# Patient Record
Sex: FEMALE | Race: WHITE
[De-identification: names, ages, dates, MRNs, and addresses within clinical notes are randomized per-mention and may not be internally consistent; named-entity substitution may affect disease eponyms.]

---

## 2017-07-03 ENCOUNTER — HOSPITAL ENCOUNTER (OUTPATIENT)
Dept: HOSPITAL 80 - BHFA | Age: 77
End: 2017-07-03
Attending: INTERNAL MEDICINE
Payer: COMMERCIAL

## 2017-07-03 DIAGNOSIS — R60.9: ICD-10-CM

## 2017-07-03 DIAGNOSIS — I10: Primary | ICD-10-CM

## 2017-07-03 DIAGNOSIS — R06.02: ICD-10-CM

## 2017-07-03 DIAGNOSIS — I42.9: ICD-10-CM

## 2017-07-20 ENCOUNTER — HOSPITAL ENCOUNTER (OUTPATIENT)
Dept: HOSPITAL 80 - BHFA | Age: 77
End: 2017-07-20
Attending: INTERNAL MEDICINE
Payer: COMMERCIAL

## 2017-07-20 DIAGNOSIS — I42.9: Primary | ICD-10-CM

## 2017-07-20 DIAGNOSIS — E78.5: ICD-10-CM

## 2017-07-20 DIAGNOSIS — G47.33: ICD-10-CM

## 2017-07-20 DIAGNOSIS — R06.02: ICD-10-CM

## 2017-07-20 DIAGNOSIS — I10: ICD-10-CM

## 2017-08-02 ENCOUNTER — HOSPITAL ENCOUNTER (OUTPATIENT)
Dept: HOSPITAL 80 - FIMAGING | Age: 77
End: 2017-08-02
Attending: INTERNAL MEDICINE
Payer: COMMERCIAL

## 2017-08-02 DIAGNOSIS — K80.20: Primary | ICD-10-CM

## 2017-08-02 DIAGNOSIS — R16.0: ICD-10-CM

## 2017-08-02 DIAGNOSIS — K76.0: ICD-10-CM

## 2017-11-11 ENCOUNTER — HOSPITAL ENCOUNTER (OUTPATIENT)
Dept: HOSPITAL 80 - FED | Age: 77
Setting detail: OBSERVATION
LOS: 1 days | Discharge: HOME | End: 2017-11-12
Attending: FAMILY MEDICINE | Admitting: FAMILY MEDICINE
Payer: COMMERCIAL

## 2017-11-11 VITALS — RESPIRATION RATE: 18 BRPM

## 2017-11-11 DIAGNOSIS — K57.92: Primary | ICD-10-CM

## 2017-11-11 DIAGNOSIS — I50.9: ICD-10-CM

## 2017-11-11 DIAGNOSIS — E27.9: ICD-10-CM

## 2017-11-11 DIAGNOSIS — J96.10: ICD-10-CM

## 2017-11-11 DIAGNOSIS — E05.00: ICD-10-CM

## 2017-11-11 DIAGNOSIS — Z86.79: ICD-10-CM

## 2017-11-11 DIAGNOSIS — E66.9: ICD-10-CM

## 2017-11-11 LAB
% IMMATURE GRANULYOCYTES: 0.6 % (ref 0–1.1)
ABSOLUTE IMMATURE GRANULOCYTES: 0.05 10^3/UL (ref 0–0.1)
ABSOLUTE NRBC COUNT: 0 10^3/UL (ref 0–0.01)
ADD DIFF?: NO
ADD MORPH?: NO
ADD SCAN?: NO
ALBUMIN SERPL-MCNC: 3.8 G/DL (ref 3.5–5)
ALP SERPL-CCNC: 146 IU/L (ref 38–126)
ALT SERPL-CCNC: 45 IU/L (ref 9–52)
ANION GAP SERPL CALC-SCNC: 10 MEQ/L (ref 8–16)
AST SERPL-CCNC: 25 IU/L (ref 14–46)
ATYPICAL LYMPHOCYTE FLAG: 0 (ref 0–99)
BACTERIA #/AREA URNS HPF: (no result) /HPF
BILIRUB SERPL-MCNC: 0.6 MG/DL (ref 0.1–1.4)
BILIRUBIN-CONJUGATED: 0.1 MG/DL (ref 0–0.5)
BILIRUBIN-UNCONJUGATED: 0.5 MG/DL (ref 0–1.1)
CALCIUM SERPL-MCNC: 9.1 MG/DL (ref 8.5–10.4)
CHLORIDE SERPL-SCNC: 100 MEQ/L (ref 97–110)
CO2 SERPL-SCNC: 30 MEQ/L (ref 22–31)
COLOR UR: YELLOW
CREAT SERPL-MCNC: 0.9 MG/DL (ref 0.6–1)
ERYTHROCYTE [DISTWIDTH] IN BLOOD BY AUTOMATED COUNT: 13.8 % (ref 11.5–15.2)
FRAGMENT RBC FLAG: 0 (ref 0–99)
GFR SERPL CREATININE-BSD FRML MDRD: > 60 ML/MIN/{1.73_M2}
GLUCOSE SERPL-MCNC: 126 MG/DL (ref 70–100)
HCT VFR BLD CALC: 41.7 % (ref 38–47)
HGB BLD-MCNC: 14.5 G/DL (ref 12.6–16.3)
LEFT SHIFT FLG: 0 (ref 0–99)
LIPEMIA HEMOLYSIS FLAG: 90 (ref 0–99)
MCH RBC BLDCO QN: 33.3 PG (ref 27.9–34.1)
MCHC RBC AUTO-ENTMCNC: 34.8 G/DL (ref 32.4–36.7)
MCV RBC AUTO: 95.9 FL (ref 81.5–99.8)
MUCOUS THREADS #/AREA URNS LPF: (no result) /LPF
NITRITE UR QL STRIP: NEGATIVE
NRBC-AUTO%: 0 % (ref 0–0.2)
PH UR STRIP: 7 [PH] (ref 5–7.5)
PLATELET # BLD: 191 10^3/UL (ref 150–400)
PLATELET CLUMPS FLAG: 0 (ref 0–99)
PMV BLD AUTO: 10.6 FL (ref 8.7–11.7)
POTASSIUM SERPL-SCNC: 4 MEQ/L (ref 3.5–5.2)
PROT SERPL-MCNC: 6.6 G/DL (ref 6.3–8.2)
RBC # BLD AUTO: 4.35 10^6/UL (ref 4.18–5.33)
RBC #/AREA URNS HPF: (no result) /HPF (ref 0–3)
SODIUM SERPL-SCNC: 140 MEQ/L (ref 134–144)
SP GR UR STRIP: 1 (ref 1–1.03)
WBC #/AREA URNS HPF: (no result) /HPF (ref 0–3)

## 2017-11-11 PROCEDURE — 97162 PT EVAL MOD COMPLEX 30 MIN: CPT

## 2017-11-11 PROCEDURE — 74177 CT ABD & PELVIS W/CONTRAST: CPT

## 2017-11-11 PROCEDURE — 97165 OT EVAL LOW COMPLEX 30 MIN: CPT

## 2017-11-11 PROCEDURE — G0378 HOSPITAL OBSERVATION PER HR: HCPCS

## 2017-11-11 PROCEDURE — 93005 ELECTROCARDIOGRAM TRACING: CPT

## 2017-11-11 RX ADMIN — SACUBITRIL AND VALSARTAN SCH EA: 24; 26 TABLET, FILM COATED ORAL at 20:46

## 2017-11-11 RX ADMIN — FUROSEMIDE SCH MG: 40 TABLET ORAL at 14:20

## 2017-11-11 RX ADMIN — CARVEDILOL SCH MG: 25 TABLET, FILM COATED ORAL at 16:50

## 2017-11-11 RX ADMIN — GABAPENTIN SCH MG: 300 CAPSULE ORAL at 16:49

## 2017-11-11 RX ADMIN — GABAPENTIN SCH MG: 300 CAPSULE ORAL at 20:46

## 2017-11-11 NOTE — GHP
[f rep st]



                                                            HISTORY AND PHYSICAL





DATE OF ADMISSION:  11/11/2017



CHIEF COMPLAINT:  Abdominal pain.



HISTORY OF PRESENT ILLNESS:  This is a 77-year-old female with history of cardiomyopathy, with ejecti
on fraction in the mid 30s, as well as diverticulitis, presented to the emergency department today wi
th abdominal pain.  The patient states that she has had bouts of this type of pain for years.  The la
st time she was formally diagnosed with diverticulitis was about 7 years ago, when she was treated wi
th antibiotics.  She did have a colonoscopy about 10 years ago, which she tells me was unremarkable. 




The patient developed some crampy left lower quadrant abdominal pain Wednesday night.  She then start
ed a clear liquid diet.  Either Wednesday or Thursday night she noticed low-grade fever.  She has bee
n on a clear liquid diet 

since her symptoms on Wednesday, but today, developed which she described as a sharp left-sided pain,
 rated 7/10, that subsequently brought her in the emergency department for further evaluation.  She d
enies any nausea or vomiting.  Her last bowel movement was on Thursday, and was described as diarrhea
.



PAST MEDICAL HISTORY:  

1.  Systolic congestive heart failure/cardiomyopathy with ejection fraction in the mid 30s.  Followed
 by Dr. Mcarthur in Cardiology.

2.  Graves disease, status post radioactive iodine ablation.  Now on replacement.

3.  Breast cancer, status post mastectomy.

4.  Obstructive sleep apnea.

5.  Chronic hypoxemic respiratory failure due to congestive heart failure.



HOME MEDICATIONS:  Reviewed, refer to [email or web address] for details.



ALLERGIES:  Miconazole.



SOCIAL HISTORY:  She denies any tobacco or illicit drug use.  She is a former smoker.  She drinks xenia
roximately an ounce of bourbon per night.



FAMILY HISTORY:  Reviewed and noncontributory.



REVIEW OF SYSTEMS:  Comprehensive 10-point review of systems was done and is negative, except for as 
mentioned in HPI and below.  CARDIOVASCULAR:  The patient denies any chest pain.  She is chronically 
short of breath and is on home oxygen.



PHYSICAL EXAM:  VITAL SIGNS:  Blood pressure 129/86, pulse 75, respiratory rate 18, O2 saturation 94%
 on room air.  Temperature afebrile.  GENERAL:  In no acute distress.  HEART:  S1, S2.  LUNGS:  Clear
.  No wheezes, rales, or rhonchi.  ABDOMEN:  Soft, with mild distention.  There is some tenderness to
 deep palpation in the left lower quadrant.  There is no guarding or rebound tenderness.  Bowel sound
s are normoactive.  EXTREMITIES:  No clubbing or cyanosis.  NEURO:  Cranial nerves 2-12 grossly intac
t.  No focal motor or sensory deficits.  SKIN:  Clear, no rashes.



DIAGNOSTICS:  CT of the abdomen was reviewed, showing acute diverticulitis at the junction of the norris
cending and sigmoid colon without abscess or perforation.  There is also a small right adrenal gland 
nodule, it is mildly increased __________ 2012, and most likely compatible with adenoma. 



WBC is 8.6, hemoglobin 14.5, hematocrit 41.7, platelets 191.  Sodium 140, potassium 4, chloride 100, 
CO2 30, BUN 12, creatinine 0.9, glucose 126.  LFTs unremarkable, with the exception of alk phosphatas
e of 146. 



EKG, which I visualized and personally interpreted, shows sinus rhythm, rate 78 beats per minute, wit
h left bundle branch block.



ASSESSMENT AND PLAN:  This is a 77-year-old female with history of cardiomyopathy, ejection fraction 
in the mid 30s, presenting with: 

1.  Abdominal pain, most likely due to diverticulitis.  Plan:  The patient will be placed on observat
ion, where we will continue IV levofloxacin and Flagyl.  We will start a soft diet.  If she continues
 to be able to tolerate diet with adequate pain control, she may be eligible for discharge in the [facility].  However, if her pain persists, and she is unable to tolerate p.o., she may require inpatient h
ospitalization.

2.  History of cardiomyopathy with chronic respiratory failure, that appears to be compensated.  Plan
:  We will monitor for signs and symptoms of congestive heart failure.

3.  Adrenal nodule seen on CT.  Most likely representing adenoma per Dr. Wolfe in radiology.  Plan:
  The patient should discuss this with her primary care provider, and monitor as indicated.





Job #:  [id number]

## 2017-11-11 NOTE — EDPHY
H & P


Time Seen by Provider: 11/11/17 07:15


HPI/ROS: 





CHIEF COMPLAINT:  Left-sided abdominal pain





HISTORY OF PRESENT ILLNESS:  Patient is a 77-year-old female with a history of 

CHF and Graves disease and obesity who comes to the emergency department 

complaining of left lower quadrant pain for the last 4 days now radiating up to 

her left upper quadrant.  She still has self-diagnosed with diverticulitis is 

in the past and states that he usually resolves with a soft diet.  She has been 

on the soft and then liquid diet since Wednesday without resolution.  No 

fevers.  Intermittent diarrhea that is been nonbloody.  No vomiting.  She does 

suffer from heartburn as well.  It has not been worse lately.  No urinary 

symptoms.  No abdominal surgeries other than tubal ligation.








REVIEW OF SYSTEMS:


Constitutional:  denies: chills, fever, recent illness, recent injury


EENTM: denies: blurred vision, double vision, nose congestion


Respiratory: denies: cough, shortness of breath


Cardiac: denies: chest pain, irregular heart rate, lightheadedness, palpitations


Gastrointestinal/Abdominal:  See HPI


Genitourinary: denies: dysuria, frequency, hematuria, pain


Musculoskeletal: denies: joint pain, muscle pain


Skin: denies: lesions, rash, jaundice, bruising


Neurological: denies: headache, numbness, paresthesia, tingling, dizziness, 

weakness


Hematologic/Lymphatic: denies: blood clots, easy bleeding, easy bruising


Immunologic/allergic: denies: HIV/AIDS, transplant








EXAM:


GENERAL:  Well-appearing, obese and in no acute distress.


HEAD:  Atraumatic, normocephalic.


EYES:  Pupils equal round and reactive to light, extraocular movements intact, 

sclera anicteric, conjunctiva are normal.


ENT:  TMs normal, nares patent, oropharynx clear without exudates.  Moist 

mucous membranes.


NECK:  Normal range of motion, supple without lymphadenopathy or JVD.


LUNGS:  Breath sounds clear to auscultation bilaterally and equal.  No wheezes 

rales or rhonchi.


HEART:  Regular rate and rhythm without murmurs, rubs or gallops.


ABDOMEN:  Soft, nontender, normoactive bowel sounds.  No guarding, no rebound.  

No masses appreciated.


BACK:  No CVA tenderness, no spinal tenderness, step-offs or deformities


EXTREMITIES:  Normal range of motion, no pitting or edema.  No clubbing or 

cyanosis.


NEUROLOGICAL:  Cranial nerves II through XII grossly intact.  Normal speech, 

normal gait.  5/5 strength, normal movement in all extremities, normal sensation


PSYCH:  Normal mood, normal affect.


SKIN:  Warm, dry, normal turgor, no visible rashes or lesions.








Source: Patient


Exam Limitations: No limitations





- Medical/Surgical History


Hx Asthma: No


Hx Chronic Respiratory Disease: No


Hx Diabetes: No


Hx Cardiac Disease: Yes


Other PMH: CHF with ejection fraction 30%, home oxygen, Graves disease, obesity





- Family History


Significant Family History: No pertinent family hx





- Social History


Alcohol Use: Sober


Drug Use: None


Constitutional: 


 Initial Vital Signs











Temperature (C)  36.9 C   11/11/17 07:18


 


Heart Rate  86   11/11/17 07:18


 


Respiratory Rate  18   11/11/17 07:18


 


Blood Pressure  146/70 H  11/11/17 07:18


 


O2 Sat (%)  93   11/11/17 07:18








 











O2 Delivery Mode               Room Air














Allergies/Adverse Reactions: 


 





miconazole [From Neosporin AF] Allergy (Unknown, Verified 11/11/17 11:41)


 Unknown








Home Medications: 














 Medication  Instructions  Recorded


 


Furosemide [Lasix 40 MG (*)] 40 mg PO BIDDIUR 09/24/10


 


Gabapentin [Neurontin] 600 mg PO TID 09/24/10


 


Levothyroxine [Synthroid 137 mcg 137 mcg PO DAILY06 09/24/10





(*)]  


 


Aspirin EC [Aspirin  mg (*)] 325 mg PO DAILY 11/11/17


 


Bupropion HCl [Wellbutrin Xl] 300 mg PO DAILY 11/11/17


 


Calcium Carbonate/Vitamin D3 1 tab PO DAILY 11/11/17





[Oyster Shell Calcium-Vit D Tab]  


 


Carvedilol [Coreg (*)] 25 mg PO BIDMEAL 11/11/17


 


Herbals/Supplements -Info Only 1 ea PO DAILY 11/11/17


 


Multivitamins [Multivitamin (*)] 1 each PO DAILY 11/11/17


 


Pramipexole Di-HCl [Mirapex 1 mg 1 - 2 mg PO HS PRN 11/11/17





(*)]  


 


Sacubitril/Valsartan 24/26Mg 1 tab PO BID 11/11/17





[Entresto 24 mg/26 mg (RX)]  


 


Spironolactone [Aldactone 25 MG 25 mg PO DAILY 11/11/17





(*)]  


 


Ciprofloxacin [Cipro] 500 mg PO BID #14 tab 11/12/17


 


metroNIDAZOLE [Flagyl 500 mg (*)] 500 mg PO BID #39 tab 11/12/17














Medical Decision Making





- Diagnostics


EKG Interpretation: 





An EKG obtained and was read and documented in trace view.  Please see trace 

view for full reading and report.  Left bundle branch block, no acute ischemic 

changes .  


ED Course/Re-evaluation: 





10:00 a.m. we discussed the CT results.  I recommended admission and 

antibiotics.  The patient agrees.  I discussed the case with Dr. Sunny Carrasquillo who 

will admit to the medical service.  Patient is currently not having any cardiac 

issues.


Differential Diagnosis: 





Partial list of the Differential diagnosis considered include but were not 

limited to;  diverticulitis, perforation, kidney stone, urinary tract infection 

and although unlikely based on the history and physical exam, I also considered 

obstruction, ischemia, chest pain. 








- Data Points


Laboratory Results: 


 Laboratory Results





 11/11/17 08:50 





 11/11/17 08:50 








Medications Given: 


 








Discontinued Medications





Acetaminophen (Tylenol)  650 mg PO Q6H PRN


   PRN Reason: Pain, Mild/Fever, Can Take PO


   Stop: 05/10/18 18:00


   Last Admin: 11/12/17 07:56 Dose:  650 mg


Aspirin Buffered (Aspirin Ec)  325 mg PO DAILY [facility]


   Stop: 05/11/18 08:59


   Last Admin: 11/12/17 07:57 Dose:  325 mg


Bupropion HCl (Wellbutrin Xl)  300 mg PO DAILY [facility]


   Stop: 05/11/18 08:59


   Last Admin: 11/12/17 12:14 Dose:  300 mg


Calcium/Vitamin D (Calcium Carb W/Vit D)  500 mg PO DAILY [facility]


   Stop: 05/11/18 08:59


   Last Admin: 11/12/17 08:00 Dose:  Not Given


Carvedilol (Coreg)  25 mg PO BIDMEAL [facility]


   Stop: 05/10/18 17:59


   Last Admin: 11/12/17 07:58 Dose:  25 mg


Enoxaparin Sodium (Lovenox)  40 mg SC DAILY [facility]


   Stop: 05/11/18 08:59


   Last Admin: 11/12/17 07:54 Dose:  40 mg


Furosemide (Lasix)  40 mg PO BIDDIUR [facility]


   Stop: 05/10/18 14:59


   Last Admin: 11/12/17 07:58 Dose:  40 mg


Gabapentin (Neurontin)  600 mg PO TID [facility]


   Stop: 05/10/18 15:59


   Last Admin: 11/12/17 07:57 Dose:  600 mg


Hydromorphone HCl (Dilaudid)  0.5 mg IVP EDNOW ONE


   Stop: 11/11/17 07:23


   Last Admin: 11/11/17 10:50 Dose:  Not Given


Hyoscyamine Sulfate (Levsin, Hyomax-Sl)  0.125 mg PO Q6HRS PRN


   PRN Reason: abd pain


   Stop: 05/10/18 12:36


   Last Admin: 11/11/17 14:23 Dose:  0.125 mg


Sodium Chloride (Ns)  500 mls @ 0 mls/hr IV EDNOW ONE; Wide Open


   PRN Reason: Protocol


   Stop: 11/11/17 07:24


   Last Admin: 11/11/17 09:13 Dose:  500 mls


Levofloxacin/Dextrose (Levaquin 750 Mg (Premix))  150 mls @ 100 mls/hr IV EDNOW 

ONE


   PRN Reason: Protocol


   Stop: 11/11/17 11:27


   Last Admin: 11/11/17 11:54 Dose:  150 mls


Metronidazole/Sodium Chloride (Flagyl 500 Mg (Premix))  100 mls @ 100 mls/hr IV 

EDNOW ONE


   PRN Reason: Protocol


   Stop: 11/11/17 10:59


   Last Admin: 11/11/17 10:31 Dose:  100 mls


Levofloxacin/Dextrose (Levaquin 750 Mg (Premix))  150 mls @ 100 mls/hr IV DAILY 

[facility]


   PRN Reason: Protocol


   Stop: 12/12/17 08:59


   Last Admin: 11/12/17 07:54 Dose:  150 mls


Metronidazole/Sodium Chloride (Flagyl 500 Mg (Premix))  100 mls @ 100 mls/hr IV 

Q8H [facility]


   PRN Reason: Protocol


   Stop: 12/11/17 17:59


   Last Admin: 11/12/17 10:24 Dose:  100 mls


Levothyroxine Sodium (Synthroid)  137 mcg PO DAILY06 [facility]


   Stop: 05/11/18 05:59


   Last Admin: 11/12/17 05:23 Dose:  137 mcg


Multivitamins (Tab-A-Gagan)  1 each PO DAILY [facility]


   Stop: 05/11/18 08:59


   Last Admin: 11/12/17 08:00 Dose:  1 each


Ondansetron HCl (Zofran)  4 mg IVP EDNOW ONE


   Stop: 11/11/17 07:23


   Last Admin: 11/11/17 10:50 Dose:  Not Given


Pramipexole Dihydrochloride (Mirapex)  1 - 2 mg PO HS PRN


   PRN Reason: restless leg


   Stop: 05/10/18 12:28


   Last Admin: 11/11/17 21:22 Dose:  1 mg


Sacubitril/Valsartan (Entresto 24 Mg/26 Mg)  1 ea PO BID [facility]


   Stop: 05/10/18 20:59


   Last Admin: 11/12/17 07:59 Dose:  1 ea


Spironolactone (Aldactone)  25 mg PO DAILY [facility]


   Stop: 05/11/18 08:59


   Last Admin: 11/12/17 12:15 Dose:  25 mg








Departure





- Departure


Disposition: [facility]


Clinical Impression: 


 Diverticulitis large intestine w/o perforation or abscess w/o bleeding





Condition: Fair

## 2017-11-11 NOTE — CPEKG
Heart Rate: 78

RR Interval: 769

P-R Interval: 196

QRSD Interval: 156

QT Interval: 432

QTC Interval: 493

P Axis: 62

QRS Axis: -44

T Wave Axis: 120

EKG Severity - ABNORMAL ECG -

EKG Impression: SINUS RHYTHM

EKG Impression: LEFT BUNDLE BRANCH BLOCK

Electronically Signed By: Anthony East 11-Nov-2017 08:58:38

## 2017-11-12 VITALS
SYSTOLIC BLOOD PRESSURE: 129 MMHG | HEART RATE: 80 BPM | DIASTOLIC BLOOD PRESSURE: 61 MMHG | TEMPERATURE: 97.5 F | OXYGEN SATURATION: 94 %

## 2017-11-12 RX ADMIN — SACUBITRIL AND VALSARTAN SCH EA: 24; 26 TABLET, FILM COATED ORAL at 07:59

## 2017-11-12 RX ADMIN — FUROSEMIDE SCH MG: 40 TABLET ORAL at 07:58

## 2017-11-12 RX ADMIN — GABAPENTIN SCH MG: 300 CAPSULE ORAL at 07:57

## 2017-11-12 RX ADMIN — CARVEDILOL SCH MG: 25 TABLET, FILM COATED ORAL at 07:58

## 2017-11-12 RX ADMIN — SPIRONOLACTONE SCH: 25 TABLET, FILM COATED ORAL at 07:58

## 2017-11-12 RX ADMIN — SPIRONOLACTONE SCH MG: 25 TABLET, FILM COATED ORAL at 12:15

## 2017-11-12 NOTE — ASDISCHSUM
----------------------------------------------

Discharge Information

----------------------------------------------

Plan Status:Home with Home Health                    Medically Cleared to Leave:

Discharge Date:11/12/2017 12:59 PM                    D/C Disposition:Home Health Service

ADT D/C Disposition:Home, Routine, Self-Care         Projected Discharge Date:11/12/2017 12:59 PM

Transportation at D/C:                               Discharge Delay Reason:

Follow-Up Date:11/12/2017 12:59 PM                   Discharge Slot:

Final Diagnosis:

----------------------------------------------

Placement Information

----------------------------------------------

----------------------------------------------

Patient Contact Information

----------------------------------------------

Contact Name:FRANCY                          Relationship:Daughter

Address:[address]                            Home [phone]

                                                     Work [phone]

City:[place]                                      Alternate Phone:

State/Zip Code:[place] [address]                              Email:

----------------------------------------------

Financial Information

----------------------------------------------

Financial Class:

Primary Plan Desc:MEDICARE OUTPATIENT                Primary Plan Number:[id number]

Secondary Plan Desc:[employer]/MDR SUPPLEMENT              Secondary Plan Number:[id number]

 

 

----------------------------------------------

Assessment Information

----------------------------------------------

----------------------------------------------

BC CM Progress Note

----------------------------------------------

CM Note

 

CM Note                       

Notes:

PT recommending HHC and use of walker which is new to pt. List given to daughter for med 

supply/loan closets. CM met w/pt and daughter and discussed HHC; pt agreeable to home PT, would 

like to use [facility]. Referral sent to [facility] and spoke w/Dasha who said they could 

accept. Orders/info sent, conf rec'd. Notified pt at home that [facility] could accept and that 

they would call her tomorrow. 

 

Date Signed:  11/12/2017 05:17 PM

Electronically Signed By:Meredith Dong RN

 

 

----------------------------------------------

Intervention Information

----------------------------------------------

## 2017-11-12 NOTE — ASMTCMCOM
CM Note

 

CM Note                       

Notes:

PT recommending HHC and use of walker which is new to pt. List given to daughter for med 

supply/loan closets. CM met w/pt and daughter and discussed HHC; pt agreeable to home PT, would 

like to use [facility]. Referral sent to [facility] and spoke w/Dasha who said they could 

accept. Orders/info sent, conf rec'd. Notified pt at home that [facility] could accept and that 

they would call her tomorrow. 

 

Date Signed:  11/12/2017 05:17 PM

Electronically Signed By:Meredith Dong RN

## 2017-11-12 NOTE — PDIAF
- Diagnosis


Diagnosis: diverticulitis


Code Status: Full Code





- Medication Management


Discharge Medications: 


 Medications to Continue on Transfer





Furosemide [Lasix 40 MG (*)] 40 mg PO BIDDIUR 09/24/10 [Last Taken 11/10/17 15:

00]


Gabapentin [Neurontin] 600 mg PO TID 09/24/10 [Last Taken 11/11/17 06:00]


Levothyroxine [Synthroid 137 mcg (*)] 137 mcg PO DAILY06 09/24/10 [Last Taken 11 /11/17]


Aspirin EC [Aspirin  mg (*)] 325 mg PO DAILY 11/11/17 [Last Taken 11/10/17

]


Bupropion HCl [Wellbutrin Xl] 300 mg PO DAILY 11/11/17 [Last Taken 11/10/17]


Calcium Carbonate/Vitamin D3 [Oyster Shell Calcium-Vit D Tab] 1 tab PO DAILY 11/ 11/17 [Last Taken 11/10/17]


Carvedilol [Coreg (*)] 25 mg PO BIDMEAL 11/11/17 [Last Taken 11/10/17 18:00]


Herbals/Supplements -Info Only 1 ea PO DAILY 11/11/17 [Last Taken Unknown]


Multivitamins [Multivitamin (*)] 1 each PO DAILY 11/11/17 [Last Taken 11/10/17]


Pramipexole Di-HCl [Mirapex 1 mg (*)] 1 - 2 mg PO HS PRN 11/11/17 [Last Taken 11

/10/17]


Sacubitril/Valsartan 24/26Mg [Entresto 24 mg/26 mg (RX)] 1 tab PO BID 11/11/17 [

Last Taken Unknown]


Spironolactone [Aldactone 25 MG (*)] 25 mg PO DAILY 11/11/17 [Last Taken 11/10/

17]


Ciprofloxacin [Cipro] 500 mg PO BID #14 tab 11/12/17 [Last Taken Unknown]


metroNIDAZOLE [Flagyl 500 mg (*)] 500 mg PO BID #39 tab 11/12/17 [Last Taken 

Unknown]








Discharge Medications: Refer to the Discharge Home Medication list for PRN 

reason.





- Orders


Services needed: Home Care, Physical Therapy


Home Care Face to Face: I certify that this patient was under my care and that 

I had the required face-to-face encounter meeting the encounter requirements on 

the discharge day.  My findings support the fact that the patient is homebound 

as defined in


Home Care Face to Face Continued: CMS Chapter 7 Medicare Benefits Manual 30.1.1

, The condition of the patient is such that there exists a normal inability to 

leave home and consequently, leaving home would require a considerable and 

taxing effort.





- Follow Up Care


Current Providers and Referrals: 


Sherine Villanueva MD [Primary Care Provider] - As per Instructions

## 2017-11-12 NOTE — GDS
[f rep st]



                                                             DISCHARGE SUMMARY





DISCHARGE DIAGNOSES:  

1.  Acute diverticulitis.

2.  History of cardiomyopathy with chronic respiratory failure and compensated congestive heart failu
re.  

3.  Adrenal nodule, most likely due to adenoma per radiology report.



HOSPITAL COURSE:  Acute diverticulitis:  The patient presented to the hospital with abdominal pain.  
She was started on IV Levaquin and Flagyl.  On hospital day #1, her abdominal pain is improving.  She
 has been afebrile.  She is tolerating a regular diet.  On day of discharge, she states she is feelin
g better and is agreeable to leave the hospital.



PHYSICAL EXAM:  VITAL SIGNS:  On day of discharge, blood pressure 129/61, pulse of 80, respiratory ra
te 18, O2 saturation 94% on 2 L.  Temperature afebrile.  GENERAL:  No acute distress.  HEART:  S1, S2
.  LUNGS:  Clear.  No wheezes, rales or rhonchi.  ABDOMEN:  Soft, nontender, nondistended.  No guardi
ng or rebound tenderness.  Normoactive bowel sounds.



DIAGNOSTICS DONE THIS HOSPITAL STAY:  A CT of the abdomen done 11/11/2017, refer to report.



DISCHARGE MEDICATIONS:  Please refer to discharge medication reconciliation in [facility] for full deta
ils.  Below is a preliminary list. 



New medications on hospital discharge:  Cipro 500 mg p.o. b.i.d. to complete 2-week course.  Flagyl 5
00 mg p.o. t.i.d. to complete a 2-week course.



DISCHARGE INSTRUCTIONS:  The patient will be discharged from the hospital, where she should follow up
 with her primary care provider as scheduled.  She was urged to seek medical attention if her pain wo
rsens, she becomes febrile, or has any worsening of her symptoms.





Job #:  [id number]

## 2018-02-23 ENCOUNTER — HOSPITAL ENCOUNTER (OUTPATIENT)
Dept: HOSPITAL 80 - CIMAGING | Age: 78
End: 2018-02-23
Attending: PODIATRIST
Payer: COMMERCIAL

## 2018-02-23 DIAGNOSIS — L97.521: ICD-10-CM

## 2018-02-23 DIAGNOSIS — M85.872: Primary | ICD-10-CM

## 2018-05-21 ENCOUNTER — HOSPITAL ENCOUNTER (OUTPATIENT)
Dept: HOSPITAL 80 - BHFA | Age: 78
End: 2018-05-21
Attending: INTERNAL MEDICINE
Payer: COMMERCIAL

## 2018-05-21 DIAGNOSIS — I44.7: ICD-10-CM

## 2018-05-21 DIAGNOSIS — Z01.810: Primary | ICD-10-CM

## 2018-05-21 DIAGNOSIS — I50.23: ICD-10-CM

## 2018-05-21 DIAGNOSIS — I11.0: ICD-10-CM

## 2018-12-03 ENCOUNTER — HOSPITAL ENCOUNTER (INPATIENT)
Dept: HOSPITAL 80 - FED | Age: 78
LOS: 9 days | Discharge: NURSING, REHAB OR LONG-TERM CARE | DRG: 223 | End: 2018-12-12
Attending: INTERNAL MEDICINE | Admitting: INTERNAL MEDICINE
Payer: COMMERCIAL

## 2018-12-03 DIAGNOSIS — E03.9: ICD-10-CM

## 2018-12-03 DIAGNOSIS — I11.0: Primary | ICD-10-CM

## 2018-12-03 DIAGNOSIS — I44.7: ICD-10-CM

## 2018-12-03 DIAGNOSIS — N28.9: ICD-10-CM

## 2018-12-03 DIAGNOSIS — B37.2: ICD-10-CM

## 2018-12-03 DIAGNOSIS — I50.22: ICD-10-CM

## 2018-12-03 DIAGNOSIS — Z87.891: ICD-10-CM

## 2018-12-03 DIAGNOSIS — Z95.0: ICD-10-CM

## 2018-12-03 DIAGNOSIS — Z90.13: ICD-10-CM

## 2018-12-03 DIAGNOSIS — E66.01: ICD-10-CM

## 2018-12-03 DIAGNOSIS — Z85.3: ICD-10-CM

## 2018-12-03 DIAGNOSIS — I42.9: ICD-10-CM

## 2018-12-03 DIAGNOSIS — G47.33: ICD-10-CM

## 2018-12-03 DIAGNOSIS — E87.1: ICD-10-CM

## 2018-12-03 LAB
INR PPP: 0.93 (ref 0.83–1.16)
PLATELET # BLD: 211 10^3/UL (ref 150–400)
PROTHROMBIN TIME: 12.7 SEC (ref 12–15)

## 2018-12-03 PROCEDURE — C1887 CATHETER, GUIDING: HCPCS

## 2018-12-03 PROCEDURE — C1882 AICD, OTHER THAN SING/DUAL: HCPCS

## 2018-12-03 PROCEDURE — C1777 LEAD, AICD, ENDO SINGLE COIL: HCPCS

## 2018-12-03 PROCEDURE — C1900 LEAD, CORONARY VENOUS: HCPCS

## 2018-12-03 PROCEDURE — C1769 GUIDE WIRE: HCPCS

## 2018-12-03 PROCEDURE — C1898 LEAD, PMKR, OTHER THAN TRANS: HCPCS

## 2018-12-03 PROCEDURE — C1751 CATH, INF, PER/CENT/MIDLINE: HCPCS

## 2018-12-03 RX ADMIN — SACUBITRIL AND VALSARTAN SCH EA: 24; 26 TABLET, FILM COATED ORAL at 21:58

## 2018-12-03 RX ADMIN — PRAMIPEXOLE DIHYDROCHLORIDE PRN MG: 1 TABLET ORAL at 21:56

## 2018-12-03 RX ADMIN — GABAPENTIN SCH MG: 300 CAPSULE ORAL at 21:57

## 2018-12-03 RX ADMIN — CARVEDILOL SCH MG: 25 TABLET, FILM COATED ORAL at 21:58

## 2018-12-03 RX ADMIN — SODIUM CHLORIDE SCH MLS: 900 INJECTION, SOLUTION INTRAVENOUS at 21:38

## 2018-12-03 NOTE — CPEKG
Test Reason : OPEN

Blood Pressure : ***/*** mmHG

Vent. Rate : 075 BPM     Atrial Rate : 075 BPM

   P-R Int : 172 ms          QRS Dur : 153 ms

    QT Int : 442 ms       P-R-T Axes : 047 -48 068 degrees

   QTc Int : 494 ms

 

Sinus rhythm

Left bundle branch block

 

Confirmed by Skylar Matos ([id number]) on 12/3/2018 7:54:48 PM

 

Referred By:             Confirmed By:Skylar Matos

## 2018-12-03 NOTE — EDPHY
H & P


Time Seen by Provider: 12/03/18 16:11


HPI/ROS: 





CHIEF COMPLAINT:  Shortness of breath, leg edema





HISTORY OF PRESENT ILLNESS:  The patient is a 70-year-old female with a history 

of systolic congestive heart failure and cardiomyopathy who presents emergency 

department with increasing shortness of breath and pedal edema.  She was seen 

by [facility].  She had increased weight of 25 lb from 1 year ago.  The she 

is noted to have an EF of 33%.  She was sent to the emergency department for 

admission and further evaluation.





REVIEW OF SYSTEMS:  


10 systems were reveiwed and are negative with the exception of the elements 

mentioned in the history of present illness.


Past Medical/Surgical History: 





Includes CHF, Graves disease, obesity hypertension, obstructive sleep apnea





Smoking Status: Former smoker


Physical Exam: 





36.4, 152/90, 81, 20, 90% on room air


GENERAL:  No acute distress, alert.  Obese


HEENT:  Eyes normal to inspection, normal pharynx, no signs of dehydration.


NECK:  Normal, supple.


RESPIRATORY:  Clear to auscultation bilaterally, no rales, rhonchi or wheezing.


CVS:  Regular rate and rhythm, no rubs, murmurs, or gallops.


ABDOMEN:  Soft, nontender, nondistended, no organomegaly.


BACK:  Normal to inspection, no CVA tenderness.


SKIN:  Normal color, no rash, warm, dry.  No pallor.


EXTREMITIES:  No pedal edema, no calf tenderness, no Homans sign or cords, no 

joint swelling.


NEURO/PSYCH:  Alert and oriented, normal mood and affect, normal motor sensory 

exam.  


Constitutional: 


 Initial Vital Signs











Temperature (C)  36.4 C   12/03/18 16:05


 


Heart Rate  81   12/03/18 16:05


 


Respiratory Rate  20   12/03/18 16:05


 


Blood Pressure  152/90 H  12/03/18 16:05


 


O2 Sat (%)  90 L  12/03/18 16:05








 











O2 Delivery Mode               Nasal Cannula


 


O2 (L/minute)                  3














Allergies/Adverse Reactions: 


 





miconazole [From Neosporin AF] Allergy (Unknown, Verified 11/11/17 11:41)


 Unknown


amoxicillin [From Augmentin] Allergy (Verified 12/03/18 16:10)


 


clavulanic acid [From Augmentin] Allergy (Verified 12/03/18 16:10)


 








Home Medications: 














 Medication  Instructions  Recorded


 


Furosemide [Lasix 40 MG (*)] 40 mg PO BIDDIUR 09/24/10


 


Gabapentin [Neurontin] 600 mg PO TID 09/24/10


 


Levothyroxine [Synthroid 137 mcg 137 mcg PO DAILY06 09/24/10





(*)]  


 


Aspirin EC [Aspirin  mg (*)] 325 mg PO DAILY 11/11/17


 


Bupropion HCl [Wellbutrin Xl] 300 mg PO DAILY 11/11/17


 


Calcium Carbonate/Vitamin D3 1 tab PO DAILY 11/11/17





[Oyster Shell Calcium-Vit D Tab]  


 


Carvedilol [Coreg (*)] 25 mg PO BIDMEAL 11/11/17


 


Herbals/Supplements -Info Only 1 ea PO DAILY 11/11/17


 


Multivitamins [Multivitamin (*)] 1 each PO DAILY 11/11/17


 


Pramipexole Di-HCl [Mirapex 1 mg 1 - 2 mg PO HS PRN 11/11/17





(*)]  


 


Sacubitril/Valsartan 24/26Mg 1 tab PO BID 11/11/17





[Entresto 24 mg/26 mg (RX)]  


 


Spironolactone [Aldactone 25 MG 25 mg PO DAILY 11/11/17





(*)]  


 


Ciprofloxacin [Cipro] 500 mg PO BID #14 tab 11/12/17


 


metroNIDAZOLE [Flagyl 500 mg (*)] 500 mg PO BID #39 tab 11/12/17














Medical Decision Making





- Diagnostics


Imaging Results: 


 Imaging Impressions





Chest X-Ray  12/03/18 16:25


Impression: Moderate cardiac enlargement with pulmonary vascular congestion in 

the lower lungs bilaterally suggestive of congestive failure..











ED Course/Re-evaluation: 





In the emergency department I discussed possible etiologies with the patient.  

I answered all her questions.  IV was placed.  Laboratory studies, chest x-ray 

and EKG were obtained.





EKG:  Left bundle branch block at 75. 





Troponin is 0.02





White count is 10.82.  Hematocrit is normal.  Chemistry panel is pending.





BNP is 402. Chemistry panel is unremarkable.





I discussed the case with Dr. Randolph.  The patient was given Lasix 40 mg IV.

  


Differential Diagnosis: 





My differential includes but is not limited to ACS, acute MI, CHF, PE, pneumonia

, bronchitis, respiratory failure





- Data Points


Laboratory Results: 


 Laboratory Results





 12/03/18 16:32 





 12/03/18 16:32 





 











  12/03/18 12/03/18 12/03/18





  16:40 16:32 16:32


 


WBC      





    


 


RBC      





    


 


Hgb      





    


 


Hct      





    


 


MCV      





    


 


MCH      





    


 


MCHC      





    


 


RDW      





    


 


Plt Count      





    


 


MPV      





    


 


Neut % (Auto)      





    


 


Lymph % (Auto)      





    


 


Mono % (Auto)      





    


 


Eos % (Auto)      





    


 


Baso % (Auto)      





    


 


Nucleat RBC Rel Count      





    


 


Absolute Neuts (auto)      





    


 


Absolute Lymphs (auto)      





    


 


Absolute Monos (auto)      





    


 


Absolute Eos (auto)      





    


 


Absolute Basos (auto)      





    


 


Absolute Nucleated RBC      





    


 


Immature Gran %      





    


 


Immature Gran #      





    


 


PT      12.7 SEC SEC





     (12.0-15.0) 


 


INR      0.93 





     (0.83-1.16) 


 


APTT      24.5 SEC SEC





     (23.0-38.0) 


 


Sodium    136 mEq/L mEq/L  





    (135-145)  


 


Potassium    4.3 mEq/L mEq/L  





    (3.3-5.0)  


 


Chloride    97 mEq/L mEq/L  





    ()  


 


Carbon Dioxide    32 mEq/l H mEq/l  





    (22-31)  


 


Anion Gap    7 mEq/L mEq/L  





    (6-14)  


 


BUN    21 mg/dL mg/dL  





    (7-23)  


 


Creatinine    0.8 mg/dL mg/dL  





    (0.6-1.0)  


 


Estimated GFR    > 60   





    


 


Glucose    141 mg/dL H mg/dL  





    ()  


 


Calcium    9.1 mg/dL mg/dL  





    (8.5-10.4)  


 


POC Troponin I  0.02 ng/mL ng/mL    





   (0.00-0.08)   


 


NT-Pro-B Natriuret Pep    402 pg/mL pg/mL  





    (0-450)  














  12/03/18





  16:32


 


WBC  10.82 10^3/uL H 10^3/uL





   (3.80-9.50) 


 


RBC  4.42 10^6/uL 10^6/uL





   (4.18-5.33) 


 


Hgb  14.3 g/dL g/dL





   (12.6-16.3) 


 


Hct  43.6 % %





   (38.0-47.0) 


 


MCV  98.6 fL fL





   (81.5-99.8) 


 


MCH  32.4 pg pg





   (27.9-34.1) 


 


MCHC  32.8 g/dL g/dL





   (32.4-36.7) 


 


RDW  15.0 % %





   (11.5-15.2) 


 


Plt Count  211 10^3/uL 10^3/uL





   (150-400) 


 


MPV  10.3 fL fL





   (8.7-11.7) 


 


Neut % (Auto)  79.3 % H %





   (39.3-74.2) 


 


Lymph % (Auto)  7.7 % L %





   (15.0-45.0) 


 


Mono % (Auto)  6.7 % %





   (4.5-13.0) 


 


Eos % (Auto)  5.0 % %





   (0.6-7.6) 


 


Baso % (Auto)  0.6 % %





   (0.3-1.7) 


 


Nucleat RBC Rel Count  0.0 % %





   (0.0-0.2) 


 


Absolute Neuts (auto)  8.58 10^3/uL H 10^3/uL





   (1.70-6.50) 


 


Absolute Lymphs (auto)  0.83 10^3/uL L 10^3/uL





   (1.00-3.00) 


 


Absolute Monos (auto)  0.72 10^3/uL 10^3/uL





   (0.30-0.80) 


 


Absolute Eos (auto)  0.54 10^3/uL H 10^3/uL





   (0.03-0.40) 


 


Absolute Basos (auto)  0.07 10^3/uL 10^3/uL





   (0.02-0.10) 


 


Absolute Nucleated RBC  0.00 10^3/uL 10^3/uL





   (0-0.01) 


 


Immature Gran %  0.7 % %





   (0.0-1.1) 


 


Immature Gran #  0.08 10^3/uL 10^3/uL





   (0.00-0.10) 


 


PT  





  


 


INR  





  


 


APTT  





  


 


Sodium  





  


 


Potassium  





  


 


Chloride  





  


 


Carbon Dioxide  





  


 


Anion Gap  





  


 


BUN  





  


 


Creatinine  





  


 


Estimated GFR  





  


 


Glucose  





  


 


Calcium  





  


 


POC Troponin I  





  


 


NT-Pro-B Natriuret Pep  





  











Point of Care Test Results: 


 Chemistry











  12/03/18





  16:40


 


POC Troponin I  0.02 ng/mL ng/mL





   (0.00-0.08) 














Departure





- Departure


Disposition: [facility]


Clinical Impression: 


 Shortness of breath





CHF (congestive heart failure)


Qualifiers:


 Heart failure type: systolic Heart failure chronicity: acute Qualified Code(s)

: I50.21 - Acute systolic (congestive) heart failure





Condition: Good


Referrals: 


Sherine Villanueva MD [Primary Care Provider] - As per Instructions

## 2018-12-03 NOTE — PDGENHP
History and Physical





- Chief Complaint


weeping edema





- History of Present Illness


77 yo F with PMH that includes chronic systolic heart failure with prior EF of 

30% as well as obesity and TIMI presenting from cardiology clinic with concerns 

of increased lower extremity swelling with now weeping edema to the groin 

bilaterally. She notes she has had some issues with increased shortness of 

breath as well, but that seemed to be intermittent and present only with 

exertion. She has gained around 25 pound in the last month or so but notes that 

swelling in the legs only got very severe in the last 2 weeks. She notes she 

has failed to follow up with cardiology as she was scheduled to and given that 

her PCP was on vacation did not follow up with her PCP either. She denies chest 

pain, she denies fevers or chills, she has never had such significant swelling 

in the past. She has not had any change in her bowel habits but notes she has 

chronic diarrhea. She lives independently with her son who notes he was 

concerned about her legs but the swelling seemed to more or less come and go 

and so he did not realize how bad it had gotten. 





History Information





- Allergies/Home Medication List


Allergies/Adverse Reactions: 








miconazole [From Neosporin AF] Allergy (Unknown, Verified 11/11/17 11:41)


 Unknown


amoxicillin [From Augmentin] Allergy (Verified 12/03/18 16:10)


 


clavulanic acid [From Augmentin] Allergy (Verified 12/03/18 16:10)


 





Home Medications: 








Furosemide [Lasix 40 MG (*)] 40 mg PO BIDDIUR 09/24/10 [Last Taken 12/03/18 08:

00]


Gabapentin [Neurontin] 600 mg PO TID 09/24/10 [Last Taken 12/03/18 08:00]


Levothyroxine [Synthroid 137 mcg (*)] 137 mcg PO DAILY06 09/24/10 [Last Taken 12 /03/18]


Aspirin EC [Aspirin  mg (*)] 325 mg PO DAILY 11/11/17 [Last Taken 12/03/18

]


Bupropion HCl [Wellbutrin Xl] 300 mg PO DAILY 11/11/17 [Last Taken 12/03/18]


Calcium Carbonate/Vitamin D3 [Oyster Shell Calcium-Vit D Tab] 1 tab PO DAILY 11/ 11/17 [Last Taken 11/10/17]


Carvedilol [Coreg (*)] 25 mg PO BIDMEAL 11/11/17 [Last Taken 12/03/18 08:00]


Herbals/Supplements -Info Only 1 ea PO DAILY 11/11/17 [Last Taken Unknown]


Multivitamins [Multivitamin (*)] 1 each PO DAILY 11/11/17 [Last Taken 11/10/17]


Pramipexole Di-HCl [Mirapex 1 mg (*)] 1 mg PO HS PRN 11/11/17 [Last Taken 11/10/

17]


Sacubitril/Valsartan 24/26Mg [Entresto 24 mg/26 mg (RX)] 1 tab PO BID 11/11/17 [

Last Taken 12/03/18 08:00]


Spironolactone [Aldactone 25 MG (*)] 25 mg PO DAILY 11/11/17 [Last Taken 12/03/ 18]





I have personally reviewed and updated: family history, medical history, social 

history, surgical history





- Past Medical History


cancer (breast cancer), CHF (systolic with EF of 30%), hypertension


Additional medical history: diverticulitis.  Graves disease and now 

hypothyroid.  chronic venous stasis with chronic lower extremity wounds.  

obesity





- Surgical History


Reports: cancer surgery (bilateral mastectomy)





- Family History


Positive for: non-pertinent





- Social History


Smoking Status: Former smoker


Alcohol Use: Occasionally


Drug Use: None


Additional social history: has 2 children, lives with her son





Review of Systems


Review of Systems: 





ROS: 10pt was reviewed & negative except for what was stated in HPI & below





Physical Exam


Physical Exam: 

















Temp Pulse Resp BP Pulse Ox


 


 36.6 C   86   14   142/78 H  97 


 


 12/03/18 20:09  12/03/18 20:09  12/03/18 20:09  12/03/18 20:09  12/03/18 20:09




















O2 (L/minute)                  3














Constitutional: not in pain, chronically ill appearing, obese


Eyes: PERRL, anicteric sclera


Ears, Nose, Mouth, Throat: moist mucous membranes, poor dentition


Cardiovascular: regular rate and rhythym, systolic murmur, edema (weeping edema 

to mid thighs)


Respiratory: reduced air movement, inspiratory crackles


Gastrointestinal: normoactive bowel sounds, soft, non-tender abdomen


Genitourinary: no bladder tenderness


Skin: warm, other (bilateral lower extremities with erythema/edema weeping to 

thighs with associated skin breakdown)


Musculoskeletal: no muscle tenderness


Neurologic: AAOx3


Psychiatric: interacting appropriately, not anxious, not encephalopathic





Lab Data & Imaging Review





 12/03/18 16:32





 12/03/18 16:32














WBC  10.82 10^3/uL (3.80-9.50)  H  12/03/18  16:32    


 


RBC  4.42 10^6/uL (4.18-5.33)   12/03/18  16:32    


 


Hgb  14.3 g/dL (12.6-16.3)   12/03/18  16:32    


 


Hct  43.6 % (38.0-47.0)   12/03/18  16:32    


 


MCV  98.6 fL (81.5-99.8)   12/03/18  16:32    


 


MCH  32.4 pg (27.9-34.1)   12/03/18  16:32    


 


MCHC  32.8 g/dL (32.4-36.7)   12/03/18  16:32    


 


RDW  15.0 % (11.5-15.2)   12/03/18  16:32    


 


Plt Count  211 10^3/uL (150-400)   12/03/18  16:32    


 


MPV  10.3 fL (8.7-11.7)   12/03/18  16:32    


 


Neut % (Auto)  79.3 % (39.3-74.2)  H  12/03/18  16:32    


 


Lymph % (Auto)  7.7 % (15.0-45.0)  L  12/03/18  16:32    


 


Mono % (Auto)  6.7 % (4.5-13.0)   12/03/18  16:32    


 


Eos % (Auto)  5.0 % (0.6-7.6)   12/03/18  16:32    


 


Baso % (Auto)  0.6 % (0.3-1.7)   12/03/18  16:32    


 


Nucleat RBC Rel Count  0.0 % (0.0-0.2)   12/03/18  16:32    


 


Absolute Neuts (auto)  8.58 10^3/uL (1.70-6.50)  H  12/03/18  16:32    


 


Absolute Lymphs (auto)  0.83 10^3/uL (1.00-3.00)  L  12/03/18  16:32    


 


Absolute Monos (auto)  0.72 10^3/uL (0.30-0.80)   12/03/18  16:32    


 


Absolute Eos (auto)  0.54 10^3/uL (0.03-0.40)  H  12/03/18  16:32    


 


Absolute Basos (auto)  0.07 10^3/uL (0.02-0.10)   12/03/18  16:32    


 


Absolute Nucleated RBC  0.00 10^3/uL (0-0.01)   12/03/18  16:32    


 


Immature Gran %  0.7 % (0.0-1.1)   12/03/18  16:32    


 


Immature Gran #  0.08 10^3/uL (0.00-0.10)   12/03/18  16:32    


 


PT  12.7 SEC (12.0-15.0)   12/03/18  16:32    


 


INR  0.93  (0.83-1.16)   12/03/18  16:32    


 


APTT  24.5 SEC (23.0-38.0)   12/03/18  16:32    


 


Sodium  136 mEq/L (135-145)   12/03/18  16:32    


 


Potassium  4.3 mEq/L (3.3-5.0)   12/03/18  16:32    


 


Chloride  97 mEq/L ()   12/03/18  16:32    


 


Carbon Dioxide  32 mEq/l (22-31)  H  12/03/18  16:32    


 


Anion Gap  7 mEq/L (6-14)   12/03/18  16:32    


 


BUN  21 mg/dL (7-23)   12/03/18  16:32    


 


Creatinine  0.8 mg/dL (0.6-1.0)   12/03/18  16:32    


 


Estimated GFR  > 60   12/03/18  16:32    


 


Glucose  141 mg/dL ()  H  12/03/18  16:32    


 


Calcium  9.1 mg/dL (8.5-10.4)   12/03/18  16:32    


 


POC Troponin I  0.02 ng/mL (0.00-0.08)   12/03/18  16:40    


 


NT-Pro-B Natriuret Pep  402 pg/mL (0-450)   12/03/18  16:32    








Visualized and Interpreted Chest x-ray results: Yes


Chest X-Ray results: other (mild cardiomegaly and pulm vasc congestoin)


Visualized and Interpreted EKG results: Yes


EKG Interpretation: Positive for: left bundle branch block





Assessment & Plan


Assessment: 








CHF (congestive heart failure) (Acute)


Shortness of breath (Acute)





77 yo F with hx of chronic systolic heart failure, timi and med non compliance 

presenting with increased lower extremity edema c/w acute chf exacerbation





# acute on chronic chf exacerbation: with most recent EF of 30% and presenting 

with severe bilateral lower extremity weeping edema and 25 pound weight gain as 

well as pulm vasc congestion. Cardiology has been consulted and recommends 

lasix gtt which will be initiated. Will place grant. Will wait several days and 

get echocardiogram at that time. Will trend trops and ecg overnight and get 

tsh. 


# weeping edema: in setting of above, does have erythema and open wounds, will 

ask wound care to consult


# timi/ohs: likely a contribution of worsening right heart failure contributing 

to above, echo, cpap at night


# chronic hypoxic respiratory failure: appears to be at baseline currently, o2 

requirement of 3L


# hypothyroid: with prior hx of Graves and hypothyroid now s/p treatment, will 

get tsh, continue lt4


# IP status, will require > 48 hours stay for eval/mgmt of above


FC--reviewed with patient and her son, daughter Becky would be MDPOA


Patient new to my care. Old records reviewed and summarized as above. Care plan 

reviewed with ER doctor including plans for cardiology consult. Further hx 

obtained from patients son present at bedside.

## 2018-12-03 NOTE — GCON
CONGESTIVE HEART FAILURE CONSULT



DATE OF CONSULTATION:  12/03/2018





CHIEF COMPLAINT:  20-pounds weight gain with weeping lower extremity edema and dyspnea on exertion at
 60-70 feet.



HISTORY OF PRESENT ILLNESS:  The patient is a 78-year-old woman with a past medical history of conges
tive heart failure with an LVEF of 33%.  She says she had a heart catheterization in the 1990s, which
 showed no coronary artery disease.  Her last known echo in July of 2017, demonstrated an LVEF of 33%
 with mild mitral and tricuspid insufficiency with an estimated PA systolic pressure of 47 mmHg.  She
 is about 14 months overdue to see us.  She has been seen in a wound clinic for chronic leg ulcers.  
She has gained about 20 pounds over the last 6 months and has weeping edema up to her groin and bilat
eral legs.  She is mostly wheelchair dependent but can walk about 60-70 feet before becoming markedly
 short of breath.  She denies chest pain or syncope or PND.  She admits she does not take her Lasix o
n a regular basis and has run out of her Entresto for 2-3 weeks at a time.



PAST MEDICAL HISTORY:  Congestive heart failure with an LVEF of 33%, chronic left bundle branch block
 on EKG, sleep apnea, previous breast cancer in 1999 treated with bilateral mastectomy but no chemoth
erapy or radiation to the chest, moderate obesity, hypothyroidism, diabetes mellitus, and noncomplian
ce.



PAST SURGICAL HISTORY:  Bilateral mastectomies.



CURRENT MEDICATIONS:  Aldactone 25 mg per day, BuSpar 300 mg per day, carvedilol 25 mg per day, aspir
in 325 mg per day, Entresto 24/26 mg b.i.d., Neurontin 600 mg t.i.d., Lasix 40 mg b.i.d., Synthroid 1
37 mcg per day.



ALLERGIES:  Adhesive tape, Augmentin, and neomycin.



SOCIAL HISTORY:  The patient denies tobacco or excessive alcohol intake.



FAMILY HISTORY:  Unremarkable for premature coronary artery disease.



REVIEW OF SYSTEMS:  The patient reports no TIA or CVA symptoms.  She has no fevers or chills or GI bl
eed symptoms such as hematemesis, melena, or bright red blood per rectum.



PHYSICAL EXAM:  VITAL SIGNS:  Weight 268 pounds.  Pulse 68, blood pressure 130/70, respirations 22.  
GENERAL:  A morbidly obese woman, sitting in a wheelchair without chest pain or using accessory respi
ratory muscles.  EYES:  Pupils equal and reactive to light.  ENT:  Oral mucosa with no cyanosis.  NEC
K:  Jugular venous pressure to 9-10 cm.  Carotid pulses 2+ bilaterally with no obvious bruits.  No nu
chal rigidity. LUNGS:  Clear to auscultation bilaterally without rales, rhonchi, or wheezing.  HEART:
  Enlarged PMI.  Regular rate and rhythm with 2/6 holosystolic murmur and positive S3 gallop.  ABDOME
N:  Soft and nontender.  No guarding or rebound.  EXTREMITIES:  3 to 4+ weeping edema to her groin bi
laterally.  MUSCULOSKELETAL:  No scoliosis.  NEURO:  Normal affect and mood.  SKIN:  No bleeding or c
yanosis.



LABORATORY DATA:  Last known labs May of 2018:  White count 8.1, hematocrit 43, platelets 2000, MCV 9
6.  Sodium 139, potassium 4.6, chloride 100, bicarb 28, BUN 21, creatinine 0.9, glucose 101.  LFTs wi
thin normal limits.



IMPRESSION/RECOMMENDATIONS:  A 78-year-old woman with acute on chronic systolic heart failure with la
st measured left ventricular ejection fraction of 33% and marked volume overload.  I think some of th
is is from medical noncompliance, but she could have worsening left ventricular dysfunction.  She is 
at the point now where she needs to be hospitalized with intravenous Lasix and a urinary Diehl.



PLAN:  

1.  Will transfer to the ER for admission via the hospitalist staff.

2.  Would start her on Lasix drip.

3.  Would continue rest of medications as per home dosing.

4.  Would probably place a urinary Diehl for the next 5-7 days.  I think there is going to be a lot o
f diuresis.

5.  Once she is more euvolemic in 4-5 days, would do an echocardiogram to reassess LV function.

6.  Eventually could be transitioned from IV Lasix to p.o. Demadex.

7.  Cardiology service will follow with you during the hospitalization.





Job #:  [id number]

## 2018-12-04 LAB — PLATELET # BLD: 210 10^3/UL (ref 150–400)

## 2018-12-04 RX ADMIN — PRAMIPEXOLE DIHYDROCHLORIDE PRN MG: 1 TABLET ORAL at 20:25

## 2018-12-04 RX ADMIN — SACUBITRIL AND VALSARTAN SCH EA: 24; 26 TABLET, FILM COATED ORAL at 20:18

## 2018-12-04 RX ADMIN — CARVEDILOL SCH MG: 25 TABLET, FILM COATED ORAL at 10:20

## 2018-12-04 RX ADMIN — THERA TABS SCH: TAB at 10:14

## 2018-12-04 RX ADMIN — SACUBITRIL AND VALSARTAN SCH EA: 24; 26 TABLET, FILM COATED ORAL at 10:20

## 2018-12-04 RX ADMIN — GABAPENTIN SCH MG: 300 CAPSULE ORAL at 20:18

## 2018-12-04 RX ADMIN — SODIUM CHLORIDE SCH MLS: 900 INJECTION, SOLUTION INTRAVENOUS at 11:58

## 2018-12-04 RX ADMIN — OXYCODONE HYDROCHLORIDE PRN MG: 15 TABLET ORAL at 01:46

## 2018-12-04 RX ADMIN — SODIUM CHLORIDE SCH MLS: 900 INJECTION, SOLUTION INTRAVENOUS at 23:01

## 2018-12-04 RX ADMIN — CARVEDILOL SCH MG: 25 TABLET, FILM COATED ORAL at 18:28

## 2018-12-04 RX ADMIN — OXYCODONE HYDROCHLORIDE PRN MG: 15 TABLET ORAL at 23:45

## 2018-12-04 RX ADMIN — OXYCODONE HYDROCHLORIDE PRN MG: 15 TABLET ORAL at 00:45

## 2018-12-04 RX ADMIN — Medication SCH: at 10:15

## 2018-12-04 RX ADMIN — ASPIRIN SCH MG: 325 TABLET, DELAYED RELEASE ORAL at 15:49

## 2018-12-04 RX ADMIN — GABAPENTIN SCH MG: 300 CAPSULE ORAL at 10:13

## 2018-12-04 RX ADMIN — ENOXAPARIN SODIUM SCH MG: 100 INJECTION SUBCUTANEOUS at 10:20

## 2018-12-04 RX ADMIN — ENOXAPARIN SODIUM SCH MG: 100 INJECTION SUBCUTANEOUS at 20:19

## 2018-12-04 RX ADMIN — GABAPENTIN SCH MG: 300 CAPSULE ORAL at 15:52

## 2018-12-04 RX ADMIN — SPIRONOLACTONE SCH MG: 25 TABLET, FILM COATED ORAL at 10:13

## 2018-12-04 RX ADMIN — LEVOTHYROXINE SODIUM SCH MCG: 137 TABLET ORAL at 07:08

## 2018-12-04 NOTE — HOSPPROG
Hospitalist Progress Note


Assessment/Plan: 





77yo F with hx of chronic systolic heart failure, timi and med non compliance 

presenting with increased lower extremity edema c/w acute chf exacerbation. 

This is my first encounter with the patient, chart reviewed.





1. Acute on chronic systolic CHF: Still largely volume overloaded


   - Continue lasix gtt, goal net neg 1-2L/day, monitor I/Os and weight


   - Continue coreg, entresto, chasity


   - Hold on grant placement. If unable to get accurate I/Os tomorrow, will 

place


   - TTE done today, pending read


   - Cardiology following, case discussed w/Jody Ceron





2. MONALISA: Cr up a touch this PM


   - Likely r/t meds (entresto, chasity) as she had been out of these and were 

restarted


   - If worse in AM, will hold above and renally dose meds (gabapentin)





3. BLE weeping wounds: C/w massive fluid overload, low concern for cellulitis


   - Wound care, diuresis





4. TIMI/OHS: Compliant with CPAP





5. Chronic hypoxemic resp insufficiency: Requires 2L





6. Hypothyroidism: TSH ok, continue home LT4





VTE ppx: LMWH


Code: full


Dispo: Remain inpatient for IV diuresis


Subjective: Legs continue to weep fluid. No change in breathing. Overall feels 

ok.  Per RN, IV fell out this AM. Unable to place peripheral so PICC placed. 

Had some NSVT during placement that resolved.


Objective: 


 Vital Signs











Temp Pulse Resp BP Pulse Ox


 


 36.8 C   81   14   124/67 H  96 


 


 12/04/18 16:00  12/04/18 16:00  12/04/18 16:00  12/04/18 16:00  12/04/18 16:00








 Laboratory Results





 12/04/18 04:00 





 12/04/18 14:30 





 











 12/03/18 12/04/18 12/05/18





 05:59 05:59 05:59


 


Intake Total  684 700


 


Output Total  1700 1125


 


Balance  -1016 -425








 











PT  12.7 SEC (12.0-15.0)   12/03/18  16:32    


 


INR  0.93  (0.83-1.16)   12/03/18  16:32    














- Physical Exam


Constitutional: no apparent distress, obese


Eyes: PERRL, anicteric sclera, EOMI


Ears, Nose, Mouth, Throat: moist mucous membranes, hearing normal, ears appear 

normal, no oral mucosal ulcers


Cardiovascular: regular rate and rhythym, systolic murmur, edema


Respiratory: no respiratory distress, reduced air movement


Gastrointestinal: normoactive bowel sounds, soft, non-tender abdomen, no 

palpable masses


Genitourinary: no bladder fullness, no bladder tenderness, no renal bruits


Skin: other (chronic venous stasis changes in BLE with evidence of acutely 

worsened edema and weeping wounds)


Musculoskeletal: full muscle strength, no muscle tenderness, normal joint ROM


Neurologic: AAOx3, sensation intact bilaterally


Psychiatric: interacting appropriately





ICD10 Worksheet


Patient Problems: 


 Problems











Problem Status Onset


 


CHF (congestive heart failure) Acute  


 


Shortness of breath Acute  


 


Diverticulitis large intestine w/o perforation or abscess w/o bleeding Acute

## 2018-12-04 NOTE — PDMN
Medical Necessity


Medical necessity: MCG M190 Heart Failure, A-2 days: 79 yo presents w/ acute on 

chronic CHF exacerbation, recent EF 30%, severe BLE weeping amy, 25 lb weight 

gain, and pulm vasc congestion. IV Lasix started. Cont tele monitoring. Wound 

and cardiology consults ordered pending.IP status, will require > 48 hours stay 

for eval/mgmt of above.  Hx breast ca, CHF, TIMI, HTN, diverticulitis, graves 

disease, obesity, chronic venous stasis w/ chronic LE wounds.

## 2018-12-04 NOTE — PDCARPN
Cardiology Progress Note


Chief Complaint: 


Swelling


Assessment/Plan: 


Assessment:





Rylie is a 77 y/o F with a history of obesity, DM, and CMP with EF of 33% by 

echo in 7/2017 who presented to our office with CORDERO, edema, and greater then 20 

pound weight gain.  She also has weeping wounds on her lower extremities.  She 

admits to being off of Entresto for 5 days and frequently missing her evening 

dose of Lasix.  She was admitted yesterday and started on a Lasix drip.  She is 

down 2KG in 12 hours.  She has noted a small improvement in her SOB and edema 

in her hands.  She is having asymptomatic NSVT on tele.  This likely occurred 

in the setting of a PICC line. 








Plan:





1. acute on chronic systolic CHF- Continue Lasix drip, entresto, and 

Spironolactone.  She is down 2 KG in 12 hours.  She still has at least 20 

pounds to go.  





2. CMP- with EF of 33%- Cath 20 years ago was negative for CAD per patient.  

She has not had any recent ischemic work-up.  She does not want a angiogram but 

I think this could be revisited in the future. Repeat echo in 3-4 days when she 

is closer to being euvolemic. 





3. nonhealing ulcers- wound care consult





4. hyperkalemia- Monitor closely





5. LBBB- old.





6. NSVT- likely related to PICC placement.  She did have some additional NSVT.  

Continue BB.  d/c QTc prolonging agents.  BMP with mag and echo ordered. 








12/04/18 14:41





Subjective: 





SOB and edema in her hands has improved.  She denies any CP or palpitations.


Objective: 





 Vital Signs (8 Hrs)











  Temp Pulse Resp BP Pulse Ox


 


 12/04/18 12:00  37.3 C  78  22 H  104/75  97


 


 12/04/18 07:35  37.0 C  82  22 H  131/65 H  96








 Intake/Output (24 Hrs)











 12/03/18 12/04/18 12/05/18





 05:59 05:59 05:59


 


Intake Total  684 350


 


Output Total  1700 175


 


Balance  -1016 175


 


Intake:   


 


  Oral (ml)  600 350


 


  IV Infused (ml)  84 


 


    Furosemide 100 mg In D5w  84 





    100 ml @ As Directed IV   





    CONT PRETTY Rx#:[id number]   


 


Output:   


 


  Urine (ml)  1700 175


 


    Bedside Commode  500 175


 


    Incontinence  400 


 


    Toilet  800 


 


Other:   


 


  Weight  121.4 kg 119.2 kg


 


  Output Comment   


 


    Incontinence  female catheter 


 


  Number of Voids   


 


    Incontinence  1 1











Result Diagrams: 


 12/04/18 04:00





 12/04/18 04:00


Cardiac Labs: 





 Cardiac Lab Results (72 Hrs)











  12/04/18 12/03/18





  04:00 21:56


 


Troponin I  0.017  0.017











Telemetry: 





NSVT





- Physical Exam


Constitutional: obese


Cardiovascular: regular rate and rhythm, no murmurs, no rubs


Respiratory: other (decreased BS at the bases)


Skin: other (severe edema bilaterally)


Neurologic: AAOx3





ICD10 Worksheet


Patient Problems: 


 Problems











Problem Status Onset


 


CHF (congestive heart failure) Acute  


 


Shortness of breath Acute  


 


Diverticulitis large intestine w/o perforation or abscess w/o bleeding Acute

## 2018-12-04 NOTE — ASMTCMCOM
CM Note

 

CM Note                       

Notes:

Spoke with pt and daughter in the room. Also discussed in rounds. Pt lives with son, and is 

planning to move in with daughter later in the month. Pt admitted for SOB, CHF exacerbation, edema 

and weepy legs. PT recommending HHC or SNF. Pt has used [facility] in the past and would like to 

work with them again. Pt informed that if she later decides to go to SNF to contact CM. Pt 

comfortable with HHC. Referral sent to [facility]. CM to follow. 



D/C Plan: [facility] PT/OT/RN

 

Date Signed:  12/04/2018 02:28 PM

Electronically Signed By:Hawa Gtz

## 2018-12-04 NOTE — ASMTCMCOM
CM Note

 

CM Note                       

Notes:

ADDENDUM: [facility] unable to accept pt as their WC RN will be out of town. [facility] has accepted. Pt 


informed. 



D/C Plan: Home with [facility] WC RN and PT

 

Date Signed:  12/04/2018 04:26 PM

Electronically Signed By:Hawa Gtz

## 2018-12-04 NOTE — ECHO
[email or web address]





[address] [address] 

Main: [phone] 



Fax: 



Transthoracic Echocardiogram 

Name:             OLIVIA CYR                            MR#:

[id number]

Study Date:       2018                             Study Time:

02:30 PM

YOB: 1940                             Age:

78 year(s)

Height:           154.9 cm (61 in.)                      Weight:

118.84 kg (262 lb.)

BSA:              2.12 m2                                Gender:

Female

Examination:      Echo with Definity                     Indication:

VT/CMP/CHF

Image Quality:    Technically Difficult                  Contrast:

0.250 mg  I.V. dose of Definity was



administered to improve endocardial border 

definition. 



Requested by:     Jody Ceron                               BP:

107 mmHg/45 mmHg

Heart Rate:                                              Rhythm: 

Indication:       VT/CMP/CHF 



Procedure Staff 

Ultrasound Technician:   [facility] 

Reading Physician:       Efraín Menjivar MD 

Requesting Provider: 



Conclusions:          Moderately dilated left ventricle.  

No LV hypertrophy.  

Moderately to severely reduced systolic function.  

EF is 33 %.  

Global hypokinesis with septal and apical dyskinesis  

Unable to assess diastolic dysfunction.  

No thrombus in left ventricle.  

Normal size right ventricle.  

Normal RV function.  

The left atrium is normal in size.  

The right atrium is normal in size.  

There is mild thickening of the mitral valve leaflets.  

No mitral stenosis is present.  

Moderate mitral valve regurgitation is present.  

Aortic valve is not well visualized.  

There is no aortic valve regurgitation.  

No aortic valve stenosis is present.  

The tricuspid valve is normal in appearance and function.  

Mild to moderate tricuspid valve regurgitation.  

The pulmonary artery pressure is mild to moderately increased.  

Pulmonary valve not well visualized.  

Normal size aortic root measuring 2.6 cm.  

The IVC is not well visualized.  

No pericardial effusion.  

Compared to a prior study dated 7/3/2017 there is no significant

change.



Measurements: 



Patient: OLIVIA CYR                         MRN: [id number]

Study Date: 2018   Page 1 of 2

02:30 PM 









[facility]ambers                      Valvular Assessment AV/MV

Valvular Assessment TV/PV

Normal                                       Normal

Normal



Name          Value       Range               Name          Value

Range             Name            Value  Range

Ao Amanda (MM):  2.6 cm      (2.2 cm-3.7             AV Vmax:      1.81

m/s (1 m/s-1.7          TR Vmax:        3.12 mm/s ( - )



cm)                                      m/s)              TR PGmax:

39 mmHg ( - )

IVSd (2D):    0.7 cm (0.6 cm-1.1                  AV maxP

mmHg ( - )            syst. PAP: 44 mmHg     ( - )



cm)                 LVOT Vmax:    1.08 m/s (0.7 m/s-1.1        PV

Vmax:        1.68 m/s (0.6 m/s-0.9

LVDd (2D):    5.9 cm      (3.9 cm-5.3

m/s)                                     m/s)



cm)                 LIZ (Vmax):   1.7 cm2 ( - )            PV PGmax:

11  mmHg ( - )

LVDs (2D):    5.3 cm      (2.1 cm-4               MV E Vmax:    0.91

m/s ( - )



cm)                 MV A Vmax:    1.18 m/s ( - )  

LVPWd (2D):   1.0 cm      ( - )               MV E/A:       0.77   ( -

)

LVOTd         1.9 cm      1.9 cm mm 

LVEF (BP):    33 %        (>=55 %)   

RVDd(2D):     3.0 cm      (1.9 cm-3.8 



cmmm)  



Continued Measurements: 

Chambers                      Valvular Assessment AV/MV

Valvular Assessment TV/PV



Name                       Value              Name

Value       Name                      Value

LADs Lon.4 cm                   MV DecTime:

148 m/s         CVP (est.):              5 mmHg

LA Area:                 20.8 cm2   



Findings:             Left Ventricle: 

Moderately dilated left ventricle. No LV hypertrophy. Moderately to

severely reduced systolic function.

EF is 33 %. Global hypokinesis with septal and apical dyskinesis

Unable to assess diastolic

dysfunction. No thrombus in left ventricle.  

Right Ventricle: 

Normal size right ventricle. Normal RV function.  

Left Atrium: 

The left atrium is normal in size.  

Right Atrium: 

The right atrium is normal in size.  

Mitral Valve: 

There is mild thickening of the mitral valve leaflets. No mitral

stenosis is present. Moderate mitral

valve regurgitation is present.  

Aortic Valve: 

Aortic valve is not well visualized. There is no aortic valve

regurgitation. No aortic valve stenosis is

present.  

Tricuspid Valve: 

The tricuspid valve is normal in appearance and function. Mild to

moderate tricuspid valve

regurgitation. Right ventricular systolic pressure measures 44mmHg.

The pulmonary artery pressure

is mild to moderately increased.  

Pulmonic Valve: 

Pulmonary valve not well visualized.  

Aorta: 

Normal size aortic root measuring 2.6 cm.  

IVC: 

The IVC is not well visualized.  

Pericardium: 

No pericardial effusion. 







Electronically signed by Efraín Menjiavr MD on 2018 at 04:46 PM 

(No Signature Object) 



Patient: OLIVIA CYR                         MRN: [id number]

Study Date: 2018   Page 2 of 2

02:30 PM 







[id number]

## 2018-12-04 NOTE — CPEKG
Test Reason : OPEN

Blood Pressure : ***/*** mmHG

Vent. Rate : 075 BPM     Atrial Rate : 075 BPM

   P-R Int : 165 ms          QRS Dur : 143 ms

    QT Int : 415 ms       P-R-T Axes : 047 -35 105 degrees

   QTc Int : 464 ms

 

Sinus rhythm

Left bundle branch block

 

Confirmed by Jl Orellana ([id number]) on 12/4/2018 12:11:09 PM

 

Referred By:             Confirmed By:Jl Orellana

## 2018-12-05 LAB — PLATELET # BLD: 222 10^3/UL (ref 150–400)

## 2018-12-05 RX ADMIN — GABAPENTIN SCH: 300 CAPSULE ORAL at 10:02

## 2018-12-05 RX ADMIN — Medication SCH: at 09:47

## 2018-12-05 RX ADMIN — ASPIRIN SCH MG: 325 TABLET, DELAYED RELEASE ORAL at 09:48

## 2018-12-05 RX ADMIN — CARVEDILOL SCH MG: 25 TABLET, FILM COATED ORAL at 19:12

## 2018-12-05 RX ADMIN — SODIUM CHLORIDE SCH MLS: 900 INJECTION, SOLUTION INTRAVENOUS at 12:58

## 2018-12-05 RX ADMIN — GABAPENTIN SCH MG: 300 CAPSULE ORAL at 09:46

## 2018-12-05 RX ADMIN — THERA TABS SCH EACH: TAB at 09:48

## 2018-12-05 RX ADMIN — ENOXAPARIN SODIUM SCH MG: 100 INJECTION SUBCUTANEOUS at 22:04

## 2018-12-05 RX ADMIN — SACUBITRIL AND VALSARTAN SCH EA: 24; 26 TABLET, FILM COATED ORAL at 22:04

## 2018-12-05 RX ADMIN — LEVOTHYROXINE SODIUM SCH MCG: 137 TABLET ORAL at 05:20

## 2018-12-05 RX ADMIN — CARVEDILOL SCH MG: 25 TABLET, FILM COATED ORAL at 09:42

## 2018-12-05 RX ADMIN — ENOXAPARIN SODIUM SCH MG: 100 INJECTION SUBCUTANEOUS at 09:52

## 2018-12-05 RX ADMIN — GABAPENTIN SCH MG: 300 CAPSULE ORAL at 22:04

## 2018-12-05 RX ADMIN — SODIUM CHLORIDE SCH MLS: 900 INJECTION, SOLUTION INTRAVENOUS at 23:21

## 2018-12-05 NOTE — PDCARPN
Cardiology Progress Note


Chief Complaint: 





Acute on chronic CHF


Chronic LBBB


NSVT


Assessment/Plan: 


Assessment:


1. Acute on chronic systolic heart failure: LVEF 33% with moderate MR and mild 

to moderate TR (stable compared to prior echo in 2017). She has been diuresing 

well, followed by Dr. Mcarthur


2. NSVT: several brief episodes of NSVT noted on telemetry, differing 

morphologies between episodes, longest roughly 17 beats. These have been 

asymptomatic. No history of syncope, pre-syncope, or palpitations 


3. BLE skin breakdown: Progressive x10 days. Seen by wound care this morning. 








Plan:


1. Encouraged patient and daughter to consider coronary angiography to be 

performed this hospitalization.


2. Infectious disease consult to evaluate BLE skin breakdown and risk for 

infection with pending BiV PPM/AICD implant 


3. Alternatives for management of NSVT discussed in detail with patient and her 

daughter, including watchful waiting, and risks/benefits of biventricular 

pacing +/- AICD. They will discuss these options as a family and will likely 

decide to proceed with implant of BiV AICD. Tentative date planned for next 

Wednesday 12/12 with Dr. Nieto, but timing will be dependent upon patient 

preference and ID consult





12/05/18 12:54





Subjective: 





Ms. Quiñonez is feeling much better compared to Monday, when she was admitted. 





Reviewed/Discussed With: multidisciplinary team


Time Spent with Patient: greater than 25 minutes


Time Spent with Patient: Greater than 25 minutes spent on this patients care, 

greater than 50% of time spent counseling, educating, and coordinating care 

regarding the above mentioned plan.


Objective: 





 Vital Signs (8 Hrs)











  Temp Pulse Resp BP Pulse Ox


 


 12/05/18 09:42   81   120/59 L 


 


 12/05/18 07:26  36.9 C  76  15  94/49 L  93








 Intake/Output (24 Hrs)











 12/04/18 12/05/18 12/06/18





 05:59 05:59 05:59


 


Intake Total 684 1100 130


 


Output Total 1700 2025 


 


Balance -1016 -925 130


 


Intake:   


 


  Oral (ml) 600 1100 


 


  IV Infused (ml) 84  130


 


    Furosemide 100 mg In D5w 84  130





    100 ml @ As Directed IV   





    CONT PRETTY Rx#:[id number]   


 


Output:   


 


  Urine (ml) 1700 2025 


 


    Bedside Commode 500 325 


 


    Catheter  1550 


 


    Incontinence 400  


 


    Toilet 800 150 


 


Other:   


 


  Weight 121.4 kg 118.9 kg 


 


  Output Comment   


 


    Incontinence female catheter  


 


  Number of Voids   


 


    Incontinence 1 1 











Result Diagrams: 


 12/05/18 03:13





 12/05/18 03:13


Cardiac Labs: 





 Cardiac Lab Results (72 Hrs)











  12/04/18 12/03/18





  04:00 21:56


 


Troponin I  0.017  0.017














- Physical Exam


Constitutional: no apparent distress, obese


Ears, Nose, Mouth, Throat: moist mucous membranes, no oral ulcers, no thrush


Cardiovascular: regular rate and rhythm, systolic murmur, other (edema)


Respiratory: clear to auscultate bilat, no crackles, no wheezes


Gastrointestinal: normoactive bowel sounds, no tenderness, no masses


Genitourinary: no suprapubic tenderness, no CVAT


Neurologic: AAOx3, CN II-XII grossly intact


Psychiatric: cooperative, interactive, following commands, not anxious





ICD10 Worksheet


Patient Problems: 


 Problems











Problem Status Onset


 


CHF (congestive heart failure) Acute  


 


Shortness of breath Acute  


 


Diverticulitis large intestine w/o perforation or abscess w/o bleeding Acute

## 2018-12-05 NOTE — SOAPPROG
SOAP Progress Note


Assessment/Plan: 


Assessment:





79 y/o woman with acute on chronic systolic CHF with LVEF 33%, chronic LBBB, 

moderate MR and DM. She is slowly diuresis. Clinically with some more volume to 

take off. Had asymptomatic NSVT on tele.





PLAN:


1)add Diamox 500mg PO BID


2)rest of meds without changes.


3)daily BMP to follow renal function closely


4)L/R cardiac cath preferably thru upper extremity either Friday or Monday


5)EP-Emilia consult to consider placement of BIVAICD this hospitalization.




















12/05/18 10:58





Subjective: 





feels less bloated and short of breath. Denies CP, palpitations, cough or near 

syncope or PND.


Objective: 





 Vital Signs











Temp Pulse Resp BP Pulse Ox


 


 36.9 C   81   15   120/59 L  93 


 


 12/05/18 07:26  12/05/18 09:42  12/05/18 07:26  12/05/18 09:42  12/05/18 07:26








 Laboratory Results





 12/05/18 03:13 





 12/05/18 03:13 





 











 12/04/18 12/05/18 12/06/18





 05:59 05:59 05:59


 


Intake Total 684 1100 130


 


Output Total 1700 2025 


 


Balance -1016 -925 130








 











PT  12.7 SEC (12.0-15.0)   12/03/18  16:32    


 


INR  0.93  (0.83-1.16)   12/03/18  16:32    














Physical Exam





- Physical Exam


General Appearance: alert, obese


EENT: PERRL/EOMI


Neck: non-tender


Respiratory: lungs clear


Cardiac/Chest: regular rate, rhythm, edema (2/6 UDAY heard), JVD, systolic murmur

, No gallop


Peripheral Pulses: 1+: femoral (R), femoral (L), dorsalis-pedis (R), dorsalis-

pedis (L), 2+: carotid (R), carotid (L)


Abdomen: non-tender, soft, No guarding, No ascites


Skin: warm/dry


Extremities: pedal edema


Neuro/Psych: alert





ICD10 Worksheet


Patient Problems: 


 Problems











Problem Status Onset


 


CHF (congestive heart failure) Acute  


 


Shortness of breath Acute  


 


Diverticulitis large intestine w/o perforation or abscess w/o bleeding Acute

## 2018-12-05 NOTE — WOCRNPDOC
WOCRN Advanced Assessment Note





- Skin Integrity Problem, Advanced Assess


  ** Right Lower Leg Unknown


Dressing Type: Adaptic Touch, Kerlix


Dressing Description: Shadowed


Exudate Amount: Excessive


Exudate Color: Yellow


Exudate Characteristic(s): Serous


Integumentary Issue Intervention: Dressing Changed


Brianna Wound Tissue: Erythema, Swollen, Weeping, Painful/Tender


Brianna Wound Swelling: Moderate


Wound Bed Color: Pink, Red


Wound Bed Constitution: Red/Pink - Non Granular Tissue


Skin Integrity Problem Comment: Discussed patient plan of care with RN Mo. 

Patient has open weeping wounds to bilateral lower extremities. Wound bed 

cleaned with normal saline. Patient's daughter in room for care. Patient 

reports neuropathic pain, especially to right leg. Wound care will round again 

next week.





  ** Left Lower Leg Unknown


Dressing Type: Adaptic Touch, Kerlix


Dressing Description: Shadowed


Exudate Amount: Excessive


Exudate Color: Yellow


Exudate Characteristic(s): Serous


Integumentary Issue Intervention: Dressing Changed


Brianna Wound Tissue: Erythema, Swollen, Weeping, Painful/Tender


Brianna Wound Swelling: Moderate


Wound Bed Color: Pink, Red


Wound Bed Constitution: Red/Pink - Non Granular Tissue


Site Measurement - Head-to-Toe Length X Width X Depth (cm): Overall area 

approximately 15x9, with several small openings


Skin Integrity Problem Comment: Wound beds cleaned with normal saline and 

gauze. Discussed patient plan of care with patient and daughter. Wound care 

will round again next week.

## 2018-12-05 NOTE — HOSPPROG
Hospitalist Progress Note


Assessment/Plan: 





79yo F with hx of chronic systolic heart failure, remi and med non compliance 

presenting with increased lower extremity edema c/w acute chf exacerbation. 





1. Acute on chronic systolic CHF: Still largely volume overloaded


   - Continue lasix gtt, goal net neg 1-2L/day, monitor I/Os and weight


   - Continue coreg, entresto, chasity


   - Cardiology adding acetazolamide today


   - TTE with stable LV function from prior, repeat once more euvolemic


   - Cardiology following, case discussed w/Dr Mcarthur


   - Plan for RHC/LHC once more euvolemic and able to lie flat


   - Candidate for BiV pacing (see below)


   - Consider palliative consultation this admission





2. MONALISA: Cr rising, likely hemodynamic from diuresis 


   - Check urine studies


   - Decrease gabapentin from TID to BID





3. NSVT: 17 beat run on tele overnight, asymptomatic


   - EP (Dr Nieto) consulted, recommending BiV pacer/AICD placement





4. BLE weeping wounds: C/w massive fluid overload. Still low concern for 

cellulitis


   - Wound care, diuresis, hold on antibiotics


   - EP recommending ID consultation, order placed





5. REMI/OHS: Compliant with CPAP





6. Chronic hypoxemic resp insufficiency: Requires 2L





7. Hypothyroidism: TSH ok, continue home LT4





VTE ppx: LMWH


Code: full


Dispo: Remain inpatient for IV diuresis


Subjective: Feeling a bit better. Noticed slight improvement in leg edema. No 

fevers. Legs still weeping quite a bit. No chest pain. Not able to lie flat.


Objective: 


 Vital Signs











Temp Pulse Resp BP Pulse Ox


 


 36.6 C   80   16   101/61   97 


 


 12/05/18 13:03  12/05/18 13:03  12/05/18 13:03  12/05/18 13:03  12/05/18 13:03








 Laboratory Results





 12/05/18 03:13 





 12/05/18 03:13 





 











 12/04/18 12/05/18 12/06/18





 05:59 05:59 05:59


 


Intake Total 684 1100 130


 


Output Total 1700 2025 


 


Balance -1016 -925 130








 











PT  12.7 SEC (12.0-15.0)   12/03/18  16:32    


 


INR  0.93  (0.83-1.16)   12/03/18  16:32    














- Physical Exam


Constitutional: no apparent distress, appears nourished, not in pain, obese


Eyes: PERRL, anicteric sclera, EOMI


Ears, Nose, Mouth, Throat: moist mucous membranes, hearing normal, ears appear 

normal, no oral mucosal ulcers


Cardiovascular: regular rate and rhythym, no murmur, rub, or gallop, edema (3+ 

BLE with weeping edema)


Respiratory: no respiratory distress, reduced air movement (bilateral bases)


Gastrointestinal: normoactive bowel sounds, soft, non-tender abdomen, no 

palpable masses


Genitourinary: no bladder fullness, no bladder tenderness, no renal bruits


Skin: other (stable appearance of legs, chronic venous stasis wiht some 

increased redness due to skin breakdown and weeping but no warmth or spreading 

or redness)


Musculoskeletal: full muscle strength, no muscle tenderness, normal joint ROM


Neurologic: AAOx3, sensation intact bilaterally


Psychiatric: interacting appropriately, not anxious, not encephalopathic, 

thought process linear





ICD10 Worksheet


Patient Problems: 


 Problems











Problem Status Onset


 


CHF (congestive heart failure) Acute  


 


Shortness of breath Acute  


 


Diverticulitis large intestine w/o perforation or abscess w/o bleeding Acute

## 2018-12-06 RX ADMIN — CARVEDILOL SCH MG: 25 TABLET, FILM COATED ORAL at 08:38

## 2018-12-06 RX ADMIN — SACUBITRIL AND VALSARTAN SCH EA: 24; 26 TABLET, FILM COATED ORAL at 22:49

## 2018-12-06 RX ADMIN — SODIUM CHLORIDE SCH MLS: 900 INJECTION, SOLUTION INTRAVENOUS at 12:00

## 2018-12-06 RX ADMIN — SACUBITRIL AND VALSARTAN SCH EA: 24; 26 TABLET, FILM COATED ORAL at 08:32

## 2018-12-06 RX ADMIN — ENOXAPARIN SODIUM SCH MG: 100 INJECTION SUBCUTANEOUS at 22:49

## 2018-12-06 RX ADMIN — ASPIRIN SCH MG: 325 TABLET, DELAYED RELEASE ORAL at 08:30

## 2018-12-06 RX ADMIN — GABAPENTIN SCH MG: 300 CAPSULE ORAL at 22:49

## 2018-12-06 RX ADMIN — CARVEDILOL SCH MG: 25 TABLET, FILM COATED ORAL at 17:17

## 2018-12-06 RX ADMIN — LEVOTHYROXINE SODIUM SCH MCG: 137 TABLET ORAL at 04:51

## 2018-12-06 RX ADMIN — Medication SCH: at 08:31

## 2018-12-06 RX ADMIN — ENOXAPARIN SODIUM SCH MG: 100 INJECTION SUBCUTANEOUS at 08:42

## 2018-12-06 RX ADMIN — PRAMIPEXOLE DIHYDROCHLORIDE PRN MG: 1 TABLET ORAL at 17:13

## 2018-12-06 RX ADMIN — SPIRONOLACTONE SCH MG: 25 TABLET, FILM COATED ORAL at 08:38

## 2018-12-06 RX ADMIN — GABAPENTIN SCH MG: 300 CAPSULE ORAL at 08:39

## 2018-12-06 RX ADMIN — THERA TABS SCH EACH: TAB at 08:29

## 2018-12-06 NOTE — SOAPPROG
SOAP Progress Note


Assessment/Plan: 


Assessment:





79 y/o woman with acute on chronic systolic CHF with LVEF 33%, chronic LBBB, 

moderate MR and DM. She has diuresed 12lbs from admission and appears near 

euvolemic. PLAN:





1)stop Lasix gtt tonight


2)hold Lovenox Friday AM


3)L/R cardiac cath tomorrow with possible PCI. R/B/A discussed with patient and 

she agrees.


4)probable BIVAICD next week.





12/06/18 11:38





Subjective: 





feels less bloated and edematous. Denies CP, palpitations or near syncope. She 

is tired. CORDERO at 50ft.


Objective: 





 Vital Signs











Temp Pulse Resp BP Pulse Ox


 


 36.7 C   74   19   105/60   94 


 


 12/06/18 07:26  12/06/18 07:26  12/06/18 07:26  12/06/18 07:26  12/06/18 07:26








 Laboratory Results





 12/05/18 03:13 





 12/06/18 04:51 





 











 12/05/18 12/06/18 12/07/18





 05:59 05:59 05:59


 


Intake Total 1100 1798 318


 


Output Total 2025 4110 175


 


Balance -925 -2312 143








 











PT  12.7 SEC (12.0-15.0)   12/03/18  16:32    


 


INR  0.93  (0.83-1.16)   12/03/18  16:32    














Physical Exam





- Physical Exam


General Appearance: alert, obese


EENT: normal ENT inspection


Neck: non-tender


Respiratory: lungs clear


Cardiac/Chest: regular rate, rhythm, systolic murmur, No gallop, No JVD


Peripheral Pulses: 1+: femoral (R), femoral (L), dorsalis-pedis (R), dorsalis-

pedis (L), 2+: carotid (R), carotid (L)


Abdomen: non-tender, No guarding, No rebound, No ascites


Rectal: No deferred


Extremities: pedal edema (1+ bilateral edema to knees.)





ICD10 Worksheet


Patient Problems: 


 Problems











Problem Status Onset


 


CHF (congestive heart failure) Acute  


 


Shortness of breath Acute  


 


Diverticulitis large intestine w/o perforation or abscess w/o bleeding Acute

## 2018-12-06 NOTE — ASMTCMCOM
CM Note

 

CM Note                       

Notes:

12/6/2018 Case Management Note



Discussed pt during rounds this morning.  Pt to have heart cath on Friday with probable BIVAICD 

placement next week Tues or Wednesday.  Anticipating d/c towards later part of next week.



Case Management d/c poc:  [facility] RN PT



Case Management to follow.

 

Date Signed:  12/06/2018 12:16 PM

Electronically Signed By:Mari Shin RN

## 2018-12-06 NOTE — HOSPPROG
Hospitalist Progress Note


Assessment/Plan: 





77yo F with hx of chronic systolic heart failure, remi and med non compliance 

presenting with increased lower extremity edema c/w acute chf exacerbation. 





1. Acute on chronic systolic CHF: Volume status improving


   - Weight down 5kg in 2 days


   - Continue lasix gtt with plan to stop this evening, continue diamox


   - Continue coreg, entresto, chasity


   - TTE with stable LV function from prior, repeat once more euvolemic


   - Plan for RHC/LHC tomorrow 


   - Candidate for BiV pacing (see below)


   - Consider palliative consultation this admission





2. MONALISA: Cr stable, likely hemodynamic from diuresis


   - Monitor daily


   - Decrease gabapentin from TID to BID





3. NSVT: 17 beat run on tele 12/5, asymptomatic


   - EP (Dr Nieto) consulted, recommending BiV pacer/AICD placement. Possibly mid 

next week





4. BLE weeping edema: C/w massive fluid overload. Still low concern for 

cellulitis


   - Wound care, diuresis, hold on antibiotics


   - EP recommending ID consultation, order placed





5. REMI/OHS: Compliant with CPAP





6. Chronic hypoxemic resp insufficiency: Requires 2L





7. Hypothyroidism: TSH ok, continue home LT4





VTE ppx: LMWH, hold in AM in anticipation of cath


Code: full


Dispo: Remain inpatient for IV diuresis, procedures. Anticipated date of 

discharge unclear at this time


Subjective: In good spirits. Slept ok. Able to lie flat last night. No chest 

pain. Thinks leg swelling is getting better. Peeing a lot. Case discussed with 

cardiology


Objective: 


 Vital Signs











Temp Pulse Resp BP Pulse Ox


 


 36.8 C   72   18   85/48 L  93 


 


 12/06/18 12:00  12/06/18 12:00  12/06/18 12:00  12/06/18 12:00  12/06/18 12:00








 Laboratory Results





 12/05/18 03:13 





 12/06/18 04:51 





 











 12/05/18 12/06/18 12/07/18





 05:59 05:59 05:59


 


Intake Total 1100 1798 318


 


Output Total 2025 4110 175


 


Balance -925 -2312 143








 











PT  12.7 SEC (12.0-15.0)   12/03/18  16:32    


 


INR  0.93  (0.83-1.16)   12/03/18  16:32    














- Physical Exam


Constitutional: no apparent distress, appears nourished, not in pain, obese


Eyes: PERRL, anicteric sclera, EOMI


Ears, Nose, Mouth, Throat: moist mucous membranes, hearing normal, ears appear 

normal, no oral mucosal ulcers


Cardiovascular: regular rate and rhythym, no murmur, rub, or gallop, edema (3+ 

but improving)


Respiratory: no respiratory distress, reduced air movement, No expiratory wheeze


Gastrointestinal: normoactive bowel sounds, soft, non-tender abdomen, no 

palpable masses


Genitourinary: no bladder fullness, no bladder tenderness, no renal bruits


Skin: other (chronic venous stasis changes in BLE with weeping edema)


Musculoskeletal: full muscle strength, no muscle tenderness, normal joint ROM


Neurologic: AAOx3, sensation intact bilaterally


Psychiatric: interacting appropriately, not anxious, not encephalopathic, 

thought process linear





ICD10 Worksheet


Patient Problems: 


 Problems











Problem Status Onset


 


CHF (congestive heart failure) Acute  


 


Shortness of breath Acute  


 


Diverticulitis large intestine w/o perforation or abscess w/o bleeding Acute

## 2018-12-07 LAB
INR PPP: 1.07 (ref 0.83–1.16)
PLATELET # BLD: 186 10^3/UL (ref 150–400)
PROTHROMBIN TIME: 14.1 SEC (ref 12–15)

## 2018-12-07 RX ADMIN — NYSTATIN SCH APP: 100000 POWDER TOPICAL at 23:32

## 2018-12-07 RX ADMIN — ASPIRIN SCH MG: 325 TABLET, DELAYED RELEASE ORAL at 08:36

## 2018-12-07 RX ADMIN — CARVEDILOL SCH MG: 25 TABLET, FILM COATED ORAL at 08:37

## 2018-12-07 RX ADMIN — NYSTATIN SCH APP: 100000 POWDER TOPICAL at 00:48

## 2018-12-07 RX ADMIN — NYSTATIN SCH APP: 100000 POWDER TOPICAL at 08:42

## 2018-12-07 RX ADMIN — GABAPENTIN SCH MG: 300 CAPSULE ORAL at 08:35

## 2018-12-07 RX ADMIN — LEVOTHYROXINE SODIUM SCH MCG: 137 TABLET ORAL at 08:41

## 2018-12-07 RX ADMIN — THERA TABS SCH: TAB at 08:36

## 2018-12-07 RX ADMIN — GABAPENTIN SCH MG: 300 CAPSULE ORAL at 23:31

## 2018-12-07 RX ADMIN — Medication SCH: at 08:35

## 2018-12-07 RX ADMIN — SACUBITRIL AND VALSARTAN SCH EA: 24; 26 TABLET, FILM COATED ORAL at 23:31

## 2018-12-07 RX ADMIN — CARVEDILOL SCH MG: 25 TABLET, FILM COATED ORAL at 23:30

## 2018-12-07 RX ADMIN — SACUBITRIL AND VALSARTAN SCH EA: 24; 26 TABLET, FILM COATED ORAL at 08:33

## 2018-12-07 RX ADMIN — SPIRONOLACTONE SCH MG: 25 TABLET, FILM COATED ORAL at 08:36

## 2018-12-07 RX ADMIN — OXYCODONE HYDROCHLORIDE PRN MG: 15 TABLET ORAL at 00:48

## 2018-12-07 NOTE — EPPROC
Electrophysiology Procedure Note: 


PROCEDURE: Bi-ventricular implantable cardioverter-defibrillator implantation





DATE OF PROCEDURE: 12/7/2018





IMPLANTED DEVICE: Ilivia 7 HF-T QP DF4 IS4 ProMRI Model# [id number] Serial# [id number]





IMPLANTED LEADS: 


Right Atrial: Biotronik Solia S 45 Model# [id number] Serial# [id number]


Right Ventricle: Biotronik Plexa ProMRI S 65 Model# [id number] Serial# [id number]   


Left Ventricle: Sentus ProMRI OTW QP S-85/49 Model# [id number] Serial# [id number]





COMPLICATIONS: None.





: Efraín Menjivar MD





INDICATION AND APPROPRIATE USE CRITERIA: The device is being implanted for non-

sustained ventricular tachycardia. 





PROCEDURE IN DETAIL: After informed consent was obtained and n.p.o. status was 

confirmed, the region of the left subclavicular fossa was cleaned, prepped and 

draped in a sterile fashion. Approximately 30 mL of 1% lidocaine was utilized 

for local anesthesia. The skin was sharply incised with a #10 blade. 

Electrocautery and local pressure were used for hemostasis. Sharp and blunt 

dissection was used to access the pacemaker pocket overlying the pectoralis 

major fascia. The pocket was thoroughly flushed and checked for bleeding. 

Hemostasis was established and the old device was removed from the pocket. The 

atrial and ventricular lead and defibrillator coil set screws were loosened. 

The chronic right atrial lead was capped and sutured with O silk.





An 18-gauge Cook needle was used to gain access to the left subclavian vein. A 

straight 0.038 Orient wire was advanced into the IVC and exchanged for a J wire 

in the inferior vena cava. A 7-F peel-away sheath was advanced over the J wire. 

The J wire and dilator were removed as well as the peel away sheath. The Atrial 

lead was manipulated with care into the RA appendage under direct fluoroscopic 

guidance and screwed into place in the right atrial appendage. The lead was 

sutured into place with O Ethibond.


The atrial lead serial number was checked and placed in the upper pole lead 

housing of the new pulse generator/AICD and set screw firmly applied. The 

procedure was repeated for the RV lead and defibrillator coils with the 

appropriate set screws. Ventricular lead threshold was tested and found to be 

0.3V at 0.4ms width. R-wave amplitude was measured at 24.20 mV. Lead impedance 

was 694 Ohms. The atrial lead threshold was tested at 0.3V at 0.4ms width. P-

wave amplitude was 7.2mV, lead impedance was 781 Ohms. The left ventricle lead 

threshold was tested at 1.4V at 0.4ms width. R-wave amplitude was 24mV, lead 

impedance is 1721 Ohms. Defibrillator shock coil impedance is 82 Ohms. The 

device was placed in the pocket and sutured in place with #0 Ethibond. The skin 

was closed with a 3-layered 3-0 Vicryl, 2-0 Vicryl and 4-0 Monocryl repair with 

excellent wound edge opposition and hemostasis documented.





The V fib zone is set at 214 BPM with ATP x 1 only if the rhythm meets 

stability criteria (12 cycles) otherwise the device will deliver a 40 Joule 

shocks x 8 cycles. The device was set with a VT1 monitoring zone of greater 

than 176BPM with ATP x 3 bursts followed by 40 Joules x 8 cycles. 





The patient returned to the post cath recovery unit in good and stable 

condition where a stat postoperative chest x-ray and EKG were obtained. There 

was no evidence of pneumothorax and the wires appeared to be in stable position.





FINAL IMPRESSION: Successful elective Bi-Ventricular implantable cardioverter-

defibrillator implantation.


Patient Problems: 


 Problems











Problem Status Onset


 


Diverticulitis large intestine w/o perforation or abscess w/o bleeding Acute  


 


CHF (congestive heart failure) Acute  


 


Shortness of breath Acute

## 2018-12-07 NOTE — HOSPPROG
Hospitalist Progress Note


Assessment/Plan: 





79yo F with hx of chronic systolic heart failure, remi and med non compliance 

presenting with increased lower extremity edema c/w acute chf exacerbation. 





1. Acute on chronic systolic CHF: Volume status improving, weight significantly 

down from admit (121->116kg)


   - Lasix on hold since last night


   - Will re-evaluate diuretic plan after RHC


   - Continue coreg, entresto, chasity


   - TTE with stable LV function from prior


   - Candidate for BiV pacing (see below)


   - Consider palliative consultation this admission





2. MONALISA: Cr stable but up from baseline, likely hemodynamic from diuresis


   - Monitor daily


   - Decreased gabapentin from TID to BID





3. NSVT: 17 beat run on tele 12/5, continues to have some PVCs/couplets, 

asymptomatic


   - LHC today to eval for ischemic trigger


   - EP (Dr Nieto) consulted, recommending BiV pacer/AICD placement. Per cards 

this could potentially happen today after LHC/RHC





4. BLE weeping edema: C/w massive fluid overload. 


   - ID evaluated, low concern for infection, no antibiotics


   - Wound care, diuresis





5. REMI/OHS: Compliant with CPAP





6. Chronic hypoxemic resp insufficiency: Requires 2L





7. Hypothyroidism: TSH ok, continue home LT4





8. Intertriginous candida: Nystatin powder





VTE ppx: Holding for procedure


Code: NPO until cath


Dispo: Remain inpatient for above procedure(s). Discharge pending clinical 

course.


Subjective: Anxious for cath today. No chest pain or shortness of breath. Pain 

in legs better. No fevers.


Objective: 


 Vital Signs











Temp Pulse Resp BP Pulse Ox


 


 36.6 C   77   20   104/51 L  91 L


 


 12/06/18 23:21  12/07/18 08:37  12/07/18 08:15  12/07/18 08:37  12/07/18 08:15








 Laboratory Results





 12/07/18 06:05 





 12/07/18 06:05 





 











 12/06/18 12/07/18 12/08/18





 05:59 05:59 05:59


 


Intake Total 1798 1638 


 


Output Total 4110 1125 


 


Balance -2312 513 








 











PT  14.1 SEC (12.0-15.0)   12/07/18  06:05    


 


INR  1.07  (0.83-1.16)   12/07/18  06:05    














- Physical Exam


Constitutional: no apparent distress, appears nourished, not in pain, obese


Eyes: PERRL, anicteric sclera, EOMI


Ears, Nose, Mouth, Throat: moist mucous membranes, hearing normal, ears appear 

normal, no oral mucosal ulcers


Cardiovascular: regular rate and rhythym, no murmur, rub, or gallop, edema (3+ 

pitting edema in BLE), No JVD


Respiratory: no respiratory distress, no rales or rhonchi, No expiratory wheeze


Gastrointestinal: normoactive bowel sounds, soft, non-tender abdomen, no 

palpable masses


Genitourinary: no bladder fullness, no bladder tenderness, no renal bruits


Skin: other (erythema under pannus, chronic venous stasis changes in BLE)


Musculoskeletal: full muscle strength, no muscle tenderness, normal joint ROM


Neurologic: AAOx3, sensation intact bilaterally


Psychiatric: interacting appropriately, not anxious, not encephalopathic, 

thought process linear





ICD10 Worksheet


Patient Problems: 


 Problems











Problem Status Onset


 


CHF (congestive heart failure) Acute  


 


Shortness of breath Acute  


 


Diverticulitis large intestine w/o perforation or abscess w/o bleeding Acute

## 2018-12-07 NOTE — GCON
INFECTIOUS DISEASES CONSULTATION



DATE OF CONSULTATION:  12/06/2018



REFERRING PHYSICIAN:  Dionisio Allen NP



REASON FOR CONSULTATION:  Bilateral lower extremity skin breakdown with need for pacemaker placement.




HISTORY OF PRESENT ILLNESS:  The patient is a 78-year-old obese female with a past medical history of
 lower extremity edema, who I am asked to see in consultation for bilateral lower extremity erythema 
and skin breakdown with need for pacemaker placement.  The patient describes developing increasing sw
elling over both lower extremities over the last 2 weeks.  This was associated with an approximate 30
 pound weight gain over the last month.  She notes that her leg subsequently developed redness and be
michele weeping clear fluid.  She has not had similar symptoms in the past, although did have recommendat
ions for compression socks in the past.  The patient does not note any fever or shaking chills.  The 
patient has needs for pacemaker placement based on presence of asymptomatic episodes of nonsustained 
ventricular tachycardia.  Given the presence of lower extremity erythema with skin breakdown, concern
s were raised about infectious risk posed by her legs at the time of pacemaker implantation.  The pat
ient also notes that she has had erythema and tenderness underneath her inguinal folds underneath her
 abdominal pannus.  She also notes a small area of similar irritation in the right axillary region.  
The patient has not had fever during her hospital stay.  She has had mild leukocytosis with a white c
ount in the 10,000 range.  She notes that she does elevate her lower extremities intermittently.  No 
prior history of skin and soft tissue infection other than developed a chronic wound over the left 5t
h toe which required amputation of the distal portion of her digit.  No history of MRSA that she is a
ware of.  Given the above findings, I am now asked to assist in her ongoing management.



PAST MEDICAL HISTORY:  Hypothyroidism, post Graves disease, breast cancer, congestive heart failure, 
hypertension, chronic venous insufficiency, obesity, obstructive sleep apnea.



PAST SURGICAL HISTORY:  Bilateral mastectomy.



CURRENT MEDICATIONS:  Diamox 500 mg p.o. b.i.d., aspirin 325 mg p.o. daily, Wellbutrin 300 mg p.o. da
mayela, calcium with vitamin D 500 mg p.o. daily, Coreg 25 mg p.o. b.i.d., Lovenox 40 mg subcutaneous b.
i.d., Pepcid 20 mg p.o. x1, Neurontin 600 mg p.o. b.i.d., Synthroid 137 mcg p.o. daily, multivitamin 
p.o. daily, Entresto 1 orally twice daily, Aldactone 25 mg p.o. daily (Lasix drip discontinued earlie
r today).



ALLERGIES:  No known drug allergies.  The patient notes diarrhea with clavulanic acid which is an int
olerance.



SOCIAL HISTORY:  Patient is a former smoker.  Uses alcohol occasionally.  No drug use.



FAMILY HISTORY:  Coronary artery disease.



REVIEW OF SYSTEMS:  Outside that noted in the HPI, remainder of 10-system review is unremarkable.



PHYSICAL EXAMINATION:  VITAL SIGNS:  Temperature 36.4, heart rate 77, respiratory rate 18, blood pres
sure 100/63, oxygen saturation 97% on 2 L.  GENERAL:  Patient is morbidly obese, in no acute distress
.  She appears nontoxic.  HEENT:  There is no scleral icterus, conjunctival injection, or conjunctiva
l petechiae.  Oropharynx shows moist mucous membranes.  No sinus tenderness.  No nasal discharge.  NE
CK:  Supple without palpable lymphadenopathy or thyromegaly.  CHEST:  Clear to auscultation bilateral
ly without adventitious sounds.  Respiratory effort is normal.  CARDIOVASCULAR:  Regular rate and rhy
thm without murmurs, gallops, or rubs.  ABDOMEN:  Obese, nontender, nondistended.  Organomegaly is di
fficult to assess for, based on body habitus.  MUSCULOSKELETAL:  There is 2+ lower extremity edema bi
laterally.  There is bilateral circumferential erythema over the shin, below the knee to above the fo
ot with the findings being more prominent on the right than the left.  Superficial ulcerations are sc
attered through the area of erythema without significant drainage.  Some lichenification is present o
rd the left anterior shin.  There is no active cellulitis.  Edema is also present in the thigh regio
n.  SKIN:  See musculoskeletal exam.  There is intertrigo present in both inguinal folds underneath h
er abdominal pannus.  There is minimal intertrigo in the right axillary region.  NEUROLOGIC:  Patient
 is alert and interacts appropriately with the examiner.  Cranial nerves 2-12 are grossly intact.  Se
nsation is grossly intact.  Muscle tone and bulk are normal.  LYMPHATICS:  No cervical or supraclavic
ular nodes.



LABORATORY DATA:  White blood cell count 10.5, hematocrit 39.2, platelets 222.  Creatinine 1.3.



IMPRESSION:  

1.  Bilateral lower extremity erythema:  Clinical findings are consistent with venous stasis dermatit
is and insufficiency.  There are numerous small ulcerations which have been draining serous fluid.  T
here are no clinical findings of active cellulitis present currently.  This finding should not signif
icantly increase her risk of perioperative infection around pacemaker placement.  She has described t
o me that the plans are for her to have radial artery access rather than inguinal access at time of p
acemaker placement, which would be more problematic given presence of intertrigo.

2.  Intertrigo:  Clinical findings in the inguinal regions consistent with intertrigo.  We will begin
 nystatin powder and use of InterDry sheets.



RECOMMENDATIONS:  

1.  Agree with continued observation off antibiotics.

2.  Elevate bilateral lower extremities.

3.  Nystatin powder and InterDry sheets to inguinal region.

4.  No infectious disease opposition to proceeding with pacemaker based on clinical appearance of leg
s being consistent with venous insufficiency and stasis dermatitis rather than active cellulitis.  

Thank you for this consultation.  We will continue to follow the patient with you.





Job #:  [id number]

## 2018-12-07 NOTE — PDDXCAT
Diagnostic Cath Note





- .


Date: 12/07/18


: Tiara


Indication: CCC Class III and IV angina on medical treatment, other





- Procedure


Access: right wrist (right brachial for the right heart cath)


Procedure: left heart catheterization, coronary angiography, right heart 

catheterization, other (exchange of PIC catheter; single lumen for double lumen)





- Materials


Left Heart Cath size: 5F


Left Heart Cath materials: JL4.0, JR4.0, pigtail





- Findings-Left Heart Catheterization


LM: The left main diagonal is 5mm in size and trifurcates into a LAD, 

circumflex and Ramus system. There is no evidence of flow-limiting disease. 

There is ROSIE III flow.


LAD: The left anterior descending is 3mm in size and gives rise to a 2mm 

diagonal branch. There are left to right collaterals to the distal right 

coronary artery via septal branches of the left anterior descending.


LCX: The circumflex gives rise to an obtuse marginal branch. There is no 

evidence of flow-limiting obstruction. There is ROSIE III flow.


RCA: The right coronary artery is 3mm in size and dominant. There is a 70% 

stenosis proximal to the acute marginal branch after which the right coronary 

artery is completely occluded.


Ramus: The ramus is 2mm in size. There is an 80% stenosis near the takeoff of 

the left main vessel.


EDP: 32mmHg





- Findings-Right Heart Catheterization


RA: 24/24/21


RV: 59/14/23


PA: 56/23/37


PAOP: 28/30/21


AO: 103/51/71


Complications: NONE


Estimated blood loss: <50ml


Closure method: Angioseal


Assessment: The patient has flow-limiting coronary disease.There is a 70% 

stenosis of the right coronary artery proximal to the acute marginal branch. 

The RCA is completely occluded distal to the acute marginal branch. The ramus 

has an ostial 80% obstruction near the take of the left main coronary vessel. 

The patient has left to right collaterals to the distal right coronary via the 

septal perforators as well as a distal apical collateral to distal PDA.


Plan: 


The patient has flow limiting coronary disease that should be treated medically 

to achieve a non-HDL Cholesterol of less than 100 mg/dL. The patient has mainly 

a non ischemic cardiomyopathy with reduced EF and LBBB and needs a bi V 

defibrillator.





Intervention: 


NONE


Patient Problems: 


 Problems











Problem Status Onset


 


Diverticulitis large intestine w/o perforation or abscess w/o bleeding Acute  


 


CHF (congestive heart failure) Acute  


 


Shortness of breath Acute

## 2018-12-07 NOTE — SOAPPROG
SOAP Progress Note


Assessment/Plan: 


Assessment:





79 y/o woman with acute on chronic systolic CHF with LVEF 33%, chronic LBBB, 

moderate MR and DM. She has diuresed 12lbs from admission and appears near 

euvolemic. 





PLAN:


1)L/R cardiac cath today followed by CRT-D if no PCI needed.


2)probably start on Demadex 60mg PO BID this evening or tomorrow


3)maybe home this weekend (off Diamox) with f/u [facility] thursday or friday of 

next week.





12/07/18 09:50





Subjective: 





a little lightheaded this AM. Denies CP, rest shortness of breath or syncope. 

Still some leg edema. Denies fevers or chills.


Objective: 





 Vital Signs











Temp Pulse Resp BP Pulse Ox


 


 36.6 C   77   20   104/51 L  91 L


 


 12/06/18 23:21  12/07/18 08:37  12/07/18 08:15  12/07/18 08:37  12/07/18 08:15








 Laboratory Results





 12/07/18 06:05 





 12/07/18 06:05 





 











 12/06/18 12/07/18 12/08/18





 05:59 05:59 05:59


 


Intake Total 1798 1638 


 


Output Total 4110 1125 


 


Balance -2312 513 








 











PT  14.1 SEC (12.0-15.0)   12/07/18  06:05    


 


INR  1.07  (0.83-1.16)   12/07/18  06:05    














Physical Exam





- Physical Exam


General Appearance: alert, obese


EENT: normal ENT inspection


Neck: non-tender


Respiratory: lungs clear


Cardiac/Chest: regular rate, rhythm, edema, systolic murmur, No gallop, No JVD


Peripheral Pulses: 1+: femoral (R), femoral (L), dorsalis-pedis (R), dorsalis-

pedis (L), 2+: carotid (R), carotid (L)


Abdomen: non-tender, No guarding, No ascites


Skin: warm/dry


Extremities: pedal edema


Neuro/Psych: alert





ICD10 Worksheet


Patient Problems: 


 Problems











Problem Status Onset


 


CHF (congestive heart failure) Acute  


 


Shortness of breath Acute  


 


Diverticulitis large intestine w/o perforation or abscess w/o bleeding Acute

## 2018-12-07 NOTE — PDPROPOC
Sedation Plan of Care


Sedation Plan of Care: vital signs stable, mental status noted, patient 

educated of risks, benefits, alternatives, patient can tolerate sedation


ASA Classification: ASA 3


Planned drugs: fentanyl, midazolam


Mallampati Score: Class 2


Mallampati Reference Image: 





Patient passed 3-3-2 rule?: No (high risk)

## 2018-12-07 NOTE — PDANEPAE
ANE History of Present Illness





cardiac cath, possible ICD placement





ANE Past Medical History





- Cardiovascular History


Hx Hypertension: No


Hx Arrhythmias: Yes


Hx Chest Pain: No


Hx CHF / Valvular Disease: Yes


Hx Palpitations: No


Cardiovascular History Comment: moderate MR, mild to mod TR, global hypokinesis 

with EF 33%, severe LE edema





- Pulmonary History


Hx COPD: No


Hx Asthma/Reactive Airway Disease: No


Hx Recent Upper Respiratory Infection: No


Hx Oxygen in Use at Home: Yes


O2 in Use at Home (L/minute): 3


Hx Sleep Apnea: Yes


Sleep Apnea Screening Result - Last Documented: Positive


Pulmonary History Comment: TIMI, has used CPAP for 5 years.





- Endocrine History


Hx Diabetes: No


Hypothyroid: Yes


Endocrine History Comment: Graves' disease over 20 years ago





- Renal History


Hx Renal Disorders: No





- Liver History


Hx Hepatic Disorders: No





- Neurological & Psychiatric Hx


Hx Neurological and Psychiatric Disorders: No





- Cancer History


Hx Cancer: Yes


Cancer History Comment: Breast cancer, skin cancer s/p removal





- Congenital Disorder History


Hx Congenital Disorders: No





- GI History


GERD: mild





- Surgical History


Prior Surgeries: S/p mastectomy, tonsillectomy





ANE Review of Systems


Review of Systems: 








ANE Patient History





- Allergies


Allergies/Adverse Reactions: 








miconazole [From Neosporin AF] Allergy (Unknown, Verified 11/11/17 11:41)


 Unknown


amoxicillin [From Augmentin] Allergy (Verified 12/03/18 16:10)


 


clavulanic acid [From Augmentin] Allergy (Verified 12/03/18 16:10)


 








- Home Medications


Home Medications: 








Furosemide [Lasix 40 MG (*)] 40 mg PO BIDDIUR 09/24/10 [Last Taken 12/03/18 08:

00]


Gabapentin [Neurontin] 600 mg PO TID 09/24/10 [Last Taken 12/03/18 08:00]


Levothyroxine [Synthroid 137 mcg (*)] 137 mcg PO DAILY06 09/24/10 [Last Taken 12 /03/18]


Aspirin EC [Aspirin  mg (*)] 325 mg PO DAILY 11/11/17 [Last Taken 12/03/18

]


Bupropion HCl [Wellbutrin Xl] 300 mg PO DAILY 11/11/17 [Last Taken 12/03/18]


Calcium Carbonate/Vitamin D3 [Oyster Shell Calcium-Vit D Tab] 1 tab PO DAILY 11/ 11/17 [Last Taken 11/10/17]


Carvedilol [Coreg (*)] 25 mg PO BIDMEAL 11/11/17 [Last Taken 12/03/18 08:00]


Herbals/Supplements -Info Only 1 ea PO DAILY 11/11/17 [Last Taken Unknown]


Multivitamins [Multivitamin (*)] 1 each PO DAILY 11/11/17 [Last Taken 11/10/17]


Pramipexole Di-HCl [Mirapex 1 mg (*)] 1 mg PO HS PRN 11/11/17 [Last Taken 11/10/

17]


Sacubitril/Valsartan 24/26Mg [Entresto 24 mg/26 mg (RX)] 1 tab PO BID 11/11/17 [

Last Taken 12/03/18 08:00]


Spironolactone [Aldactone 25 MG (*)] 25 mg PO DAILY 11/11/17 [Last Taken 12/03/ 18]








- NPO status


NPO Since - Liquids (Date): 12/06/18


NPO Since - Liquids (Time): 23:59


NPO Since - Solids (Date): 12/06/18


NPO Since - Solids (Time): 23:59





- Anes Hx


Anes Hx: no prior problems





- Smoking Hx


Smoking Status: Former smoker (stopped smoking in 21 years ago, smoked 1 ppd 

for about 40 years)


Marijuana use: No





- Alcohol Use


Alcohol Use: Occasionally





- Family Anes Hx


Family Anes Hx: none





ANE Labs/Vital Signs





- Labs


Result Diagrams: 


 12/07/18 06:05





 12/07/18 06:05





- Vital Signs


Blood Pressure: 104/51


Heart Rate: 77


Respiratory Rate: 20


O2 Sat (%): 91


Height: 154.94 cm


Weight: 116.8 kg





ANE Physical Exam





- Airway


Neck exam: FROM


Mallampati Score: Class 3


Mouth exam: normal dental/mouth exam





- ASA Status


ASA Status: III





ANE Anesthesia Plan


Anesthesia Plan: GA with mask

## 2018-12-08 LAB — PLATELET # BLD: 193 10^3/UL (ref 150–400)

## 2018-12-08 RX ADMIN — NYSTATIN SCH APP: 100000 POWDER TOPICAL at 21:31

## 2018-12-08 RX ADMIN — SACUBITRIL AND VALSARTAN SCH EA: 24; 26 TABLET, FILM COATED ORAL at 10:20

## 2018-12-08 RX ADMIN — GABAPENTIN SCH MG: 300 CAPSULE ORAL at 10:20

## 2018-12-08 RX ADMIN — Medication SCH: at 10:21

## 2018-12-08 RX ADMIN — PRAMIPEXOLE DIHYDROCHLORIDE PRN MG: 1 TABLET ORAL at 21:47

## 2018-12-08 RX ADMIN — NYSTATIN SCH: 100000 POWDER TOPICAL at 10:21

## 2018-12-08 RX ADMIN — LEVOTHYROXINE SODIUM SCH MCG: 137 TABLET ORAL at 04:35

## 2018-12-08 RX ADMIN — GABAPENTIN SCH MG: 300 CAPSULE ORAL at 21:32

## 2018-12-08 RX ADMIN — ASPIRIN SCH MG: 325 TABLET, DELAYED RELEASE ORAL at 10:21

## 2018-12-08 RX ADMIN — CARVEDILOL SCH MG: 25 TABLET, FILM COATED ORAL at 17:34

## 2018-12-08 RX ADMIN — CARVEDILOL SCH MG: 25 TABLET, FILM COATED ORAL at 10:20

## 2018-12-08 RX ADMIN — ACETAMINOPHEN PRN MG: 325 TABLET ORAL at 17:34

## 2018-12-08 RX ADMIN — SPIRONOLACTONE SCH MG: 25 TABLET, FILM COATED ORAL at 10:23

## 2018-12-08 RX ADMIN — ENOXAPARIN SODIUM SCH MG: 100 INJECTION SUBCUTANEOUS at 21:31

## 2018-12-08 RX ADMIN — THERA TABS SCH EACH: TAB at 10:20

## 2018-12-08 RX ADMIN — ENOXAPARIN SODIUM SCH MG: 100 INJECTION SUBCUTANEOUS at 10:21

## 2018-12-08 NOTE — HOSPPROG
Hospitalist Progress Note


Assessment/Plan: 





# acute on chronic sCHF exacerbation, mainly non-ischemic but she does have non-

flow limiting disease


   - 6kg diuresis


   - cont coreg, entresto, aldactone, diamox


# CAD - asa, LDL 61


# possible RV perforation d/t AICD lead


   - HD stable, no tamponade


   - repeat echo tomorrow


# mild MONALISA - slightly above baseline


   - no nephrotoxins (received toradol, contrast)


# morbid obesity


# NSVT - now with AICD


# BLE weeping edema - d/t fluid overload


# TIMI - CPAP


# hypothyroid - synthroid


# intertriginous candidiasis - nystatin powder


# dispo - recommending snf





Subjective: s/p cath and bi-V ECG;  no CP today;  we discussed SNF placement


Objective: 


 Vital Signs











Temp Pulse Resp BP Pulse Ox


 


 36.5 C   61   20   85/54 L  97 


 


 12/08/18 12:00  12/08/18 12:00  12/08/18 12:00  12/08/18 12:00  12/08/18 12:00








 Laboratory Results





 12/08/18 04:30 





 12/08/18 04:30 





 











 12/07/18 12/08/18 12/09/18





 05:59 05:59 05:59


 


Intake Total 1638 300 980


 


Output Total 1125 500 25


 


Balance 513 -200 955








 











PT  14.1 SEC (12.0-15.0)   12/07/18  06:05    


 


INR  1.07  (0.83-1.16)   12/07/18  06:05    








chart reviewed


discussed with Dr Menjivar


echo reviewed





- Physical Exam


Constitutional: obese


Ears, Nose, Mouth, Throat: hearing normal


Cardiovascular: regular rate and rhythym, no murmur, rub, or gallop, other (L 

sided PPM)


Respiratory: no respiratory distress, no rales or rhonchi, clear to auscultation


Gastrointestinal: normoactive bowel sounds, soft, non-tender abdomen, no 

palpable masses





ICD10 Worksheet


Patient Problems: 


 Problems











Problem Status Onset


 


Diverticulitis large intestine w/o perforation or abscess w/o bleeding Acute  


 


CHF (congestive heart failure) Acute  


 


Shortness of breath Acute

## 2018-12-08 NOTE — ECHO
[email or web address]





[address] [address] 

Main: [phone] 



Fax: 



Transthoracic Echocardiogram 

Name:            OLIVIA CYR                           MR#:

[id number]

Study Date:      12/08/2018                            Study Time:

09:20 AM

YOB: 1940                            Age:

78 year(s)

Height:            ( )                                 Weight:

( )

BSA:                                                   Gender:

Female

Examination:     Limited Echo                          Indication:

LTD echo to assess for pericardial effusion



S/P pacemaker 

Image Quality:   Technically Difficult                 Contrast: 

Requested by:    Daniel Randolph                       BP:

/

Heart Rate:                                            Rhythm: 

Indication:      LTD echo to assess for pericardial effusion S/P

pacemaker



Procedure Staff 

Ultrasound Technician:   Miranda Russell [facility] 

Reading Physician:       Chris Lobato MD 

Requesting Provider: 



Conclusions:          There is a pacemaker lead noted in the right

ventricle.

Trivial pericardial effusion.  

No echocardiographic evidence of hemodynamic compromise. 



Measurements: 

Chambers                   Valvular Assessment AV/MV          Valvular

Assessment TV/PV



Normal                                Normal

Normal

Name         Value    Range             Name        Value Range

Name          Value Range



Continued Measurements: 



Findings: 

Right Ventricle: 

There is a pacemaker lead noted in the right ventricle.  

Pericardium: 

Trivial pericardial effusion. No echocardiographic evidence of

hemodynamic compromise. There is

pericardial fat. 







Electronically signed by Chris Lobato MD on 12/08/2018 at 11:24 AM 

(No Signature Object) 



Patient: OLIVIA CRY                        MRN: [id number]

Study Date: 12/08/2018   Page 1 of 1

09:20 AM 







[id number]

## 2018-12-08 NOTE — CPEKG
Test Reason : OPEN

Blood Pressure : ***/*** mmHG

Vent. Rate : 060 BPM     Atrial Rate : 060 BPM

   P-R Int : 194 ms          QRS Dur : 146 ms

    QT Int : 470 ms       P-R-T Axes : -10 178 -09 degrees

   QTc Int : 470 ms

 

Atrial-ventricular dual-paced rhythm

 

Confirmed by Jl Orellana ([id number]) on 12/8/2018 12:59:16 PM

 

Referred By:             Confirmed By:Jl Orellana

## 2018-12-08 NOTE — PDCARPN
Cardiology Progress Note


Chief Complaint: 


post bi V defib complicated by pericardial staining


Assessment/Plan: 


Assessment: Non ischemic and ischemic cardiomyopathy s/p BiV AICD complicated 

by pericardial staining the differential of which is related to catheter trauma 

to the coronary sinus, RV lead screw perforation in the apex or inadvertent 

placement of the rv lead in the middle cardiac vein. The patient feels better 

this morning and the echo does not show an increasing effusion. The LV function 

has improved. The daughter who is a CVC nurse is in favor of repeating the echo 

once again tomorrow morning. I think that will be reasonable. 


























Plan: As above.





12/08/18 12:12





Subjective: 


I am feeling better and less short of breath.


Reviewed/Discussed With: family, multidisciplinary team


Time Spent with Patient: greater than 25 minutes


Time Spent with Patient: Greater than 25 minutes spent on this patients care, 

greater than 50% of time spent counseling, educating, and coordinating care 

regarding the above mentioned plan.


Objective: 





 Vital Signs (8 Hrs)











  Temp Pulse Resp BP Pulse Ox


 


 12/08/18 10:20   77   108/61 


 


 12/08/18 09:47   77  24 H   95


 


 12/08/18 07:30  36.6 C  66  20  108/61  95








 Intake/Output (24 Hrs)











 12/07/18 12/08/18 12/09/18





 05:59 05:59 05:59


 


Intake Total 1638 300 360


 


Output Total 1125 500 


 


Balance 513 -200 360


 


Intake:   


 


  Oral (ml) 1518 300 360


 


  IV Intake (ml) 120  


 


Output:   


 


  Urine (ml) 1125 500 


 


    Bedside Commode 500  


 


    Catheter 625 500 


 


Other:   


 


  Weight 116.4 kg 116.8 kg 117.6 kg


 


  Intake Quantity Yes Yes 





  Sufficient   


 


  Output Comment   


 


    Catheter external cath  


 


  Number of Voids   


 


    Catheter   1











Result Diagrams: 


 12/08/18 04:30





 12/08/18 04:30


Cardiac Labs: 


I feel better an less short of breath...


EKG: 


I feel better and less short of breath with walking.


Telemetry: 


atrial sensed BiV paced rhythm with occasional PVc's





- Physical Exam


Constitutional: no apparent distress, obese


Eyes: PERRL, anicteric sclera, No pale conjunctiva


Ears, Nose, Mouth, Throat: moist mucous membranes


Cardiovascular: regular rate and rhythm, systolic murmur


Respiratory: clear to auscultate bilat, no crackles


Gastrointestinal: normoactive bowel sounds


Psychiatric: cooperative, interactive, following commands, not anxious





ICD10 Worksheet


Patient Problems: 


 Problems











Problem Status Onset


 


Diverticulitis large intestine w/o perforation or abscess w/o bleeding Acute  


 


CHF (congestive heart failure) Acute  


 


Shortness of breath Acute

## 2018-12-09 LAB — PLATELET # BLD: 197 10^3/UL (ref 150–400)

## 2018-12-09 RX ADMIN — ENOXAPARIN SODIUM SCH: 100 INJECTION SUBCUTANEOUS at 08:21

## 2018-12-09 RX ADMIN — GABAPENTIN SCH MG: 300 CAPSULE ORAL at 20:13

## 2018-12-09 RX ADMIN — THERA TABS SCH EACH: TAB at 10:31

## 2018-12-09 RX ADMIN — GABAPENTIN SCH MG: 300 CAPSULE ORAL at 10:31

## 2018-12-09 RX ADMIN — ASPIRIN SCH MG: 325 TABLET, DELAYED RELEASE ORAL at 10:31

## 2018-12-09 RX ADMIN — OXYCODONE HYDROCHLORIDE PRN MG: 15 TABLET ORAL at 21:37

## 2018-12-09 RX ADMIN — Medication SCH: at 13:53

## 2018-12-09 RX ADMIN — NYSTATIN SCH: 100000 POWDER TOPICAL at 10:45

## 2018-12-09 RX ADMIN — LEVOTHYROXINE SODIUM SCH MCG: 137 TABLET ORAL at 05:14

## 2018-12-09 RX ADMIN — ACETAMINOPHEN PRN MG: 325 TABLET ORAL at 15:36

## 2018-12-09 RX ADMIN — CARVEDILOL SCH: 25 TABLET, FILM COATED ORAL at 08:42

## 2018-12-09 RX ADMIN — NYSTATIN SCH APP: 100000 POWDER TOPICAL at 20:14

## 2018-12-09 RX ADMIN — SACUBITRIL AND VALSARTAN SCH: 24; 26 TABLET, FILM COATED ORAL at 00:11

## 2018-12-09 RX ADMIN — CARVEDILOL SCH MG: 3.12 TABLET, FILM COATED ORAL at 21:39

## 2018-12-09 NOTE — HOSPPROG
Hospitalist Progress Note


Assessment/Plan: 





# acute on chronic sCHF exacerbation, (EF 30%) mainly non-ischemic but she does 

have non-flow limiting disease


   - 6kg diuresis


   - biV AICD placed 12/7


   - cont hold all diuretics today given MONALISA


# possible RV perforation d/t AICD lead


   - HD stable, no tamponade on last echo


   - repeat echo today


# CAD - asa, LDL 61


# MONALISA - worse today, little UOP per RN


   - suspect d/t XIOMARA, NSAID, mild hypotension


   - receiving 300cc this am, recheck BMP after; renal consult if function 

worsening


# morbid obesity


# NSVT - now with AICD


# BLE weeping edema - d/t fluid overload


# TIMI - CPAP


# hypothyroid - synthroid


# intertriginous candidiasis - nystatin powder


# dispo - recommending snf





Subjective: cc - s/p biV pacer;  more somnolent this morning;  says she has 

been a wake for some time but sleeping when i see her;  little uop per RN;


Objective: 


 Vital Signs











Temp Pulse Resp BP Pulse Ox


 


 37.1 C   67   20   121/58 H  92 


 


 12/09/18 04:00  12/09/18 04:00  12/09/18 04:00  12/09/18 04:00  12/09/18 04:00








 Laboratory Results





 12/09/18 05:15 





 12/09/18 05:15 





 











 12/08/18 12/09/18 12/10/18





 05:59 05:59 05:59


 


Intake Total 300 1880 


 


Output Total 500 225 


 


Balance -200 1655 








 











PT  14.1 SEC (12.0-15.0)   12/07/18  06:05    


 


INR  1.07  (0.83-1.16)   12/07/18  06:05    








high risk with MONALISA





- Physical Exam


Constitutional: obese


Cardiovascular: regular rate and rhythym (very distant), no murmur, rub, or 

gallop


Respiratory: no respiratory distress, no rales or rhonchi, clear to auscultation


Gastrointestinal: normoactive bowel sounds, soft, non-tender abdomen, no 

palpable masses





ICD10 Worksheet


Patient Problems: 


 Problems











Problem Status Onset


 


Diverticulitis large intestine w/o perforation or abscess w/o bleeding Acute  


 


CHF (congestive heart failure) Acute  


 


Shortness of breath Acute

## 2018-12-10 LAB — PLATELET # BLD: 197 10^3/UL (ref 150–400)

## 2018-12-10 RX ADMIN — ACETAMINOPHEN PRN MG: 325 TABLET ORAL at 11:55

## 2018-12-10 RX ADMIN — Medication SCH MG: at 08:34

## 2018-12-10 RX ADMIN — CARVEDILOL SCH MG: 3.12 TABLET, FILM COATED ORAL at 18:28

## 2018-12-10 RX ADMIN — GABAPENTIN SCH MG: 300 CAPSULE ORAL at 08:33

## 2018-12-10 RX ADMIN — ASPIRIN SCH MG: 325 TABLET, DELAYED RELEASE ORAL at 08:34

## 2018-12-10 RX ADMIN — THERA TABS SCH EACH: TAB at 08:33

## 2018-12-10 RX ADMIN — LEVOTHYROXINE SODIUM SCH MCG: 137 TABLET ORAL at 05:30

## 2018-12-10 RX ADMIN — NYSTATIN SCH APP: 100000 POWDER TOPICAL at 21:38

## 2018-12-10 RX ADMIN — ENOXAPARIN SODIUM SCH MG: 100 INJECTION SUBCUTANEOUS at 21:37

## 2018-12-10 RX ADMIN — SPIRONOLACTONE SCH MG: 25 TABLET, FILM COATED ORAL at 11:50

## 2018-12-10 RX ADMIN — CARVEDILOL SCH MG: 3.12 TABLET, FILM COATED ORAL at 08:33

## 2018-12-10 RX ADMIN — NYSTATIN SCH APP: 100000 POWDER TOPICAL at 08:34

## 2018-12-10 RX ADMIN — OXYCODONE HYDROCHLORIDE AND ACETAMINOPHEN PRN TAB: 5; 325 TABLET ORAL at 21:58

## 2018-12-10 RX ADMIN — PRAMIPEXOLE DIHYDROCHLORIDE PRN MG: 1 TABLET ORAL at 21:36

## 2018-12-10 RX ADMIN — GABAPENTIN SCH MG: 300 CAPSULE ORAL at 21:37

## 2018-12-10 NOTE — ECHO
[email or web address]





[address] [address] 

Main: [phone] 



Fax: 



Transthoracic Echocardiogram 

Name:            OLIVIA CYR                           MR#:

[id number]

Study Date:      12/09/2018                            Study Time:

09:27 AM

YOB: 1940                            Age:

78 year(s)

Height:            ( )                                 Weight:

( )

BSA:                                                   Gender:

Female

Examination:     Limited Echo                          Indication:

LTD echo to assess for pericardial effusion



S/P pacemaker 

Image Quality:   Adequate                              Contrast: 

Requested by:    Efraín Menjivar                          BP:

117 mmHg/59 mmHg

Heart Rate:                                            Rhythm: 

Indication:      LTD echo to assess for pericardial effusion S/P

pacemaker



Procedure Staff 

Ultrasound Technician:   Miranda Russell [facility] 

Reading Physician:       Blanco Dumont MD 

Requesting Provider: 



Conclusions:          There is a pacemaker lead noted in the right

ventricle.

Trivial pericardial effusion.  

No significant change compared to 12/8/2018. 



Measurements: 

Chambers                   Valvular Assessment AV/MV          Valvular

Assessment TV/PV



Normal                                Normal

Normal

Name        Value     Range             Name        Value Range

Name          Value Range



Continued Measurements: 



Findings: 

Right Ventricle: 

There is a pacemaker lead noted in the right ventricle.  

Pericardium: 

Trivial pericardial effusion. No echocardiographic evidence of

hemodynamic compromise. There is

pericardial fat. 







Electronically signed by Blanco Dumont MD on 12/10/2018 at 03:59 AM 

(No Signature Object) 



Patient: OLIVIA CRY                        MRN: [id number]

Study Date: 12/09/2018   Page 1 of 1

09:27 AM 







[id number]

## 2018-12-10 NOTE — PDCARPN
Cardiology Progress Note


Assessment/Plan: 


Assessment:


1. Chronic Systolic CHF with LVEF 33%


2. NICM s/p BiV ICD


3. Acute Renal insufficiency


4. Hyponatremia


5. Pericardial Stain: stable on echo Dec 8 and 9





Plan:


-Restart Spironolactone 25 mg daily


-Restart Entresto


-Continue Coreg 12.5 mg bid


-Plan to restart Lasix tomorrow after assesing electrolytes and renal function


-Labs in AM: BMP, BNP, Mg


-Encourage ambulation today and oob in chair 





12/10/18 09:53





Subjective: 





Rylie is doing well this AM. No complaints. Alert and appropriate. Confusion 

yesterday most likely multifactorial, primarily due to increase in BUN.  Cr 1.5 

today and BUN 47.  ICD site without hematoma or ecchymosis. 


Reviewed/Discussed With: family, hospitalist, multidisciplinary team


Objective: 





 Vital Signs (8 Hrs)











  Temp Pulse Resp BP Pulse Ox


 


 12/10/18 07:25  36.9 C  76  18  127/56 H  95


 


 12/10/18 04:00  36.5 C  77  20  98/45 L  96








 Intake/Output (24 Hrs)











 12/09/18 12/10/18 12/11/18





 05:59 05:59 05:59


 


Intake Total 1880 1950 


 


Output Total 225 1350 


 


Balance 1655 600 


 


Intake:   


 


  Oral (ml) 1880 1050 


 


  IV Intake (ml)  600 


 


  IV Infused (ml)  300 


 


    Ns 1,000 ml @ 100 mls/hr  300 





    IV CONT PRETTY Rx#:   





    [id number]   


 


Output:   


 


  Urine (ml) 225 1350 


 


    Bedside Commode  300 


 


    Catheter 100 950 


 


    Toilet 125 100 


 


Other:   


 


  Weight 117.6 kg 119.6 kg 


 


  Output Comment   


 


    Catheter  from night shift 


 


  Number of Voids   


 


    Bedside Commode  2 


 


    Catheter 1  


 


  Number of Stools   


 


    Bedside Commode  2 


 


    Incontinence  1 


 


    Toilet 1  











Result Diagrams: 


 12/10/18 05:30





 12/10/18 05:30





- Physical Exam


Constitutional: obese


Ears, Nose, Mouth, Throat: moist mucous membranes


Cardiovascular: regular rate and rhythm, no murmurs, no rubs, other (2+ pitting 

edema to knee bilat )


Respiratory: clear to auscultate bilat


Neurologic: AAOx3, CN II-XII grossly intact


Psychiatric: cooperative, interactive, following commands





ICD10 Worksheet


Patient Problems: 


 Problems











Problem Status Onset


 


CHF (congestive heart failure) Acute  


 


Shortness of breath Acute  


 


Diverticulitis large intestine w/o perforation or abscess w/o bleeding Acute

## 2018-12-10 NOTE — HOSPPROG
Hospitalist Progress Note


Assessment/Plan: 





# acute on chronic sCHF exacerbation, (EF 30%) mainly non-ischemic but she does 

have non-flow limiting disease


   - weight slightly up today


   - biV AICD placed 12/7


   - restart entresto, aldactone


   - cont coreg at low dose 3.125 bid


# possible RV perforation d/t AICD lead


   - HD stable, no tamponade on multiple echos


# CAD - asa, LDL 61


# MONALISA - better today


   - suspect d/t XIOMARA, NSAID, mild hypotension


   - recheck tomorrow


# morbid obesity


# NSVT - now with AICD


# BLE weeping edema - d/t fluid overload


# TIMI - CPAP


# hypothyroid - synthroid


# intertriginous candidiasis - nystatin powder


# dispo - recommending snf





Subjective: feels much better today;  still mild tremors


Objective: 


 Vital Signs











Temp Pulse Resp BP Pulse Ox


 


 36.9 C   76   18   127/56 H  95 


 


 12/10/18 07:25  12/10/18 07:25  12/10/18 07:25  12/10/18 07:25  12/10/18 07:25








 Laboratory Results





 12/10/18 05:30 





 12/10/18 05:30 





 











 12/09/18 12/10/18 12/11/18





 05:59 05:59 05:59


 


Intake Total 1880 1950 


 


Output Total 225 1350 


 


Balance 1655 600 








 











PT  14.1 SEC (12.0-15.0)   12/07/18  06:05    


 


INR  1.07  (0.83-1.16)   12/07/18  06:05    








chart reviewed


discussed with Dr Nir nagel personally reviewed





- Physical Exam


Constitutional: chronically ill appearing, obese


Cardiovascular: regular rate and rhythym, no murmur, rub, or gallop


Respiratory: no respiratory distress, no rales or rhonchi, clear to auscultation


Gastrointestinal: normoactive bowel sounds, soft, non-tender abdomen, no 

palpable masses





ICD10 Worksheet


Patient Problems: 


 Problems











Problem Status Onset


 


Diverticulitis large intestine w/o perforation or abscess w/o bleeding Acute  


 


CHF (congestive heart failure) Acute  


 


Shortness of breath Acute

## 2018-12-10 NOTE — WOCRNPDOC
WOCRN Advanced Assessment Note





- Skin Integrity Problem, Advanced Assess


  ** Right Lower Leg Unknown


Dressing Type: ABD Pad, Kerlix


Dressing Description: Shadowed


Closure Description: Not Approximated


Exudate Characteristic(s): Serosanguinous, Serous


Integumentary Issue Intervention: Dressing Removed


Brianna Wound Tissue: Erythema, Swollen, Weeping, Xerotic, Crusted, Painful/Tender


Brianna Wound Swelling: Moderate


Wound Bed Color: Pink, Red, Yellow


Wound Bed Constitution: Red/Pink - Non Granular Tissue, Dried Exudate


Wound Edges: Irregular


Site Measurement - Head-to-Toe Length X Width X Depth (cm): open area to right 

lateral leg 3.2x2.2x0.2


Skin Integrity Problem Comment: Patient has weeping edema to bilateral lower 

extremities. Patient reports more pain to right leg than left. Patient has open 

area to lateral aspect that is particularly painful. Proximal portion of wound 

scabbed over, and small distal portions of leg actively draining serous 

exudate. Remaining lower leg skin very dry with 50% crusted over with dried 

exudate. Dressing was missing contact layer, and was difficult and painful to 

remove. Patient was going to shower. Discussed patient care with ANN Perry 

who was in room for care. Wound care will continue to follow.





  ** Left Lower Leg Unknown


Dressing Type: ABD Pad, Kerlix, Mepilex Transfer


Dressing Description: Clean/Dry, Intact


Closure Description: Not Approximated


Exudate Amount: Minimal


Exudate Color: Yellow


Exudate Characteristic(s): Serous


Integumentary Issue Intervention: Dressing Removed


Brianna Wound Tissue: Erythema, Swollen, Weeping, Crusted, Painful/Tender


Brianna Wound Swelling: Moderate


Wound Bed Color: Pink, Red, Yellow


Wound Bed Constitution: Red/Pink - Non Granular Tissue, Dried Exudate


Skin Integrity Problem Comment: Patient has weeping edema to bilateral lower 

extremities. Left leg has actively draining weeping edema, surrounded by areas 

of dry skin with 65% crusted over with exudate. Brianna wound skin very dry. Wound 

care will continue to follow. Education provided to ANN Perry re: use of 

contact layer and attractain lotion to dry skin. Wound care will follow.

## 2018-12-11 LAB — PLATELET # BLD: 205 10^3/UL (ref 150–400)

## 2018-12-11 RX ADMIN — CARVEDILOL SCH MG: 6.25 TABLET, FILM COATED ORAL at 18:32

## 2018-12-11 RX ADMIN — Medication SCH MG: at 08:58

## 2018-12-11 RX ADMIN — THERA TABS SCH EACH: TAB at 08:57

## 2018-12-11 RX ADMIN — OXYCODONE HYDROCHLORIDE AND ACETAMINOPHEN PRN TAB: 5; 325 TABLET ORAL at 22:20

## 2018-12-11 RX ADMIN — GABAPENTIN SCH MG: 300 CAPSULE ORAL at 22:20

## 2018-12-11 RX ADMIN — CARVEDILOL SCH MG: 3.12 TABLET, FILM COATED ORAL at 08:57

## 2018-12-11 RX ADMIN — ASPIRIN SCH MG: 325 TABLET, DELAYED RELEASE ORAL at 08:56

## 2018-12-11 RX ADMIN — ENOXAPARIN SODIUM SCH MG: 100 INJECTION SUBCUTANEOUS at 08:56

## 2018-12-11 RX ADMIN — NYSTATIN SCH APP: 100000 POWDER TOPICAL at 07:55

## 2018-12-11 RX ADMIN — FUROSEMIDE SCH MG: 40 TABLET ORAL at 14:27

## 2018-12-11 RX ADMIN — SPIRONOLACTONE SCH MG: 25 TABLET, FILM COATED ORAL at 08:56

## 2018-12-11 RX ADMIN — GABAPENTIN SCH MG: 300 CAPSULE ORAL at 08:57

## 2018-12-11 RX ADMIN — SACUBITRIL AND VALSARTAN SCH EA: 24; 26 TABLET, FILM COATED ORAL at 22:21

## 2018-12-11 RX ADMIN — ENOXAPARIN SODIUM SCH MG: 100 INJECTION SUBCUTANEOUS at 22:20

## 2018-12-11 RX ADMIN — LEVOTHYROXINE SODIUM SCH MCG: 137 TABLET ORAL at 04:53

## 2018-12-11 RX ADMIN — NYSTATIN SCH APP: 100000 POWDER TOPICAL at 22:21

## 2018-12-11 NOTE — PDCARPN
Cardiology Progress Note


Assessment/Plan: 


Assessment:


1. Chronic Systolic CHF with LVEF 33%


2. NICM s/p BiV ICD


3. Acute Renal insufficiency


4. Hyponatremia


5. Pericardial Stain: stable on echo Dec 8 and 9





Plan:


-Increase Coreg to 6.25 mg bid


-Agree with Lasix 20 mg daily


-Continue Spironolactone 25 mg daily


-Continue Entresto 


-Labs in AM: BMP, BNP, Mg


-Encourage ambulation today and oob in chair 





12/10/18 09:53





12/11/18 17:11





Subjective: 





Rylie continues to make gradual progress. Her weight is down 1 kg from 

yesterday. Cr continues to improve and is down to 1.2.  No new issues regarding 

confusion or tremor.  ICD site is without hematoma or ecchymosis.  No 

complaints of increased sob or ward. 


Reviewed/Discussed With: hospitalist, multidisciplinary team


Time Spent with Patient: greater than 25 minutes


Time Spent with Patient: Greater than 25 minutes spent on this patients care, 

greater than 50% of time spent counseling, educating, and coordinating care 

regarding the above mentioned plan.


Objective: 





 Vital Signs (8 Hrs)











  Temp Pulse Resp BP Pulse Ox


 


 12/11/18 15:49  36.8 C  74  18  158/72 H  90 L


 


 12/11/18 11:47   77  18  117/73  88 L








 Intake/Output (24 Hrs)











 12/10/18 12/11/18 12/12/18





 05:59 05:59 05:59


 


Intake Total 1950 400 


 


Output Total 1350 550 950


 


Balance 600 -150 -950


 


Intake:   


 


  Oral (ml) 1050 400 


 


  IV Intake (ml) 600 0 


 


  IV Infused (ml) 300  


 


    Ns 1,000 ml @ 100 mls/hr 300  





    IV CONT PRETTY Rx#:   





    [id number]   


 


Output:   


 


  Urine (ml) 1350 550 950


 


    Bedside Commode 300  


 


    Catheter 950 300 700


 


    Incontinence  250 


 


    Toilet 100  250


 


Other:   


 


  Weight 119.6 kg 118.9 kg 117.4 kg


 


  Output Comment   


 


    Catheter from night shift  


 


  Number of Voids   


 


    Bedside Commode 2 1 


 


    Incontinence   1


 


    Toilet  2 


 


  Number of Stools   


 


    Bedside Commode 2  


 


    Incontinence 1  


 


    Toilet  1 











Result Diagrams: 


 12/11/18 04:55





 12/11/18 04:55





- Physical Exam


Constitutional: obese


Cardiovascular: regular rate and rhythm, no murmurs, no rubs, no gallops, other 

(3+ dense edema to knee bilat with weeping drainage from Right leg )


Respiratory: clear to auscultate bilat


Neurologic: AAOx3, CN II-XII grossly intact


Psychiatric: cooperative, interactive, following commands





ICD10 Worksheet


Patient Problems: 


 Problems











Problem Status Onset


 


CHF (congestive heart failure) Acute  


 


Chronic Disease Mgmt/Transitional Care Acute  


 


Shortness of breath Acute  


 


Diverticulitis large intestine w/o perforation or abscess w/o bleeding Acute

## 2018-12-11 NOTE — ASMTCMCOM
CM Note

 

CM Note                       

Notes:

Spoke with patient's daughter about d/c planning. She requested a referral to [facility]. Patient 

has been resistant to the idea of SNF but she lives alone, and her children agree that she is not 

safe to return home. Referral sent. Case Management will follow. 

 

Date Signed:  12/11/2018 10:22 AM

Electronically Signed By:Tita Muñoz RN

## 2018-12-11 NOTE — ASMTCMCOM
CM Note

 

CM Note                       

Notes:

12/11/2018 Case Management Note



Discussed pt during rounds this morning.  Anticipating d/c tomorrow.



Transitional Care met w/pt today for education and will follow after d/c.



Met w/pt to discuss discharge plans.  Pt in agreement that [facility] is appropriate. Updated 

[facility].



Case Management d/c poc:  [facility]



Case Management to follow.

 

Date Signed:  12/11/2018 01:39 PM

Electronically Signed By:Mari Shin RN

## 2018-12-11 NOTE — HOSPPROG
Hospitalist Progress Note


Assessment/Plan: 


78-year-old with a history of chronic systolic heart failure with LVEF of 33% 

and obstructive sleep apnea on CPAP is admitted with increasing lower extremity 

edema and weeping from Cardiology Clinic.  She had been on a Lasix drip from 

December 3rd through 6th and had a bump in creatinine and has been off of Lasix 

since then.  Her creatinine has improved today to 1.2





#  acute on chronic systolic congestive heart failure with the EF of 30%.  Mild 

non flow limiting coronary artery disease likely nonischemic.


* Currently on spironolactone, Entresto and Coreg


* Weight down 1 kg


* Creatinine improving


* Will discuss with Cardiology regarding discharge planning and resumption of 

Lasix





#  status post pacemaker





#  acute renal failure, improving likely multifactorial due to diuresis as well 

as angiogram, improving daily


* Continue to monitor creatinine





#  possible RV perforation due to AICD line, trivial pericardial effusion noted 

on echocardiogram with no change





#  CAD, nonobstructive currently on aspirin and LDL at goal





#  morbid obesity





#  chronic edema with venous stasis wounds bilaterally.


* Will need ongoing wound care post hospitalization


* Likely exacerbated by congestive heart failure





#  obstructive sleep apnea on CPAP





#  hypothyroid, on Synthroid





#  Intertriginous  candidiasis on nystatin powder.








Disposition:  Add on Lasix today, recheck renal function and electrolytes 

tomorrow morning if everything is stable she can likely be discharged to 

[facility] rehab


Subjective: Patient new to me and chart reviewed.  Feels her edema is better 

but ongoing wounds in her lower extremity seem worse.  Denies shortness of 

breath or chest pain.  Will be going to skilled nursing post hospitalization


Objective: 


 Vital Signs











Temp Pulse Resp BP Pulse Ox


 


 36.4 C   76   18   118/53 L  90 L


 


 12/11/18 07:52  12/11/18 07:52  12/11/18 07:52  12/11/18 07:52  12/11/18 07:52








 Laboratory Results





 12/11/18 04:55 





 12/11/18 04:55 





 











 12/10/18 12/11/18 12/12/18





 05:59 05:59 05:59


 


Intake Total 1950 400 


 


Output Total 1350 550 


 


Balance 600 -150 








 











PT  14.1 SEC (12.0-15.0)   12/07/18  06:05    


 


INR  1.07  (0.83-1.16)   12/07/18  06:05    














- Physical Exam


Constitutional: no apparent distress, obese


Eyes: PERRL, EOMI


Ears, Nose, Mouth, Throat: moist mucous membranes


Cardiovascular: regular rate and rhythym, edema


Respiratory: no respiratory distress, clear to auscultation, reduced air 

movement


Gastrointestinal: soft, non-tender abdomen


Genitourinary: no bladder fullness


Skin: other (Bilateral bandages on her lower extremities)


Musculoskeletal: generalized weakness


Neurologic: No facial droop


Psychiatric: interacting appropriately





ICD10 Worksheet


Patient Problems: 


 Problems











Problem Status Onset


 


Chronic Disease Mgmt/Transitional Care Acute  


 


Diverticulitis large intestine w/o perforation or abscess w/o bleeding Acute  


 


CHF (congestive heart failure) Acute  


 


Shortness of breath Acute

## 2018-12-12 VITALS — DIASTOLIC BLOOD PRESSURE: 57 MMHG | SYSTOLIC BLOOD PRESSURE: 133 MMHG

## 2018-12-12 RX ADMIN — CARVEDILOL SCH MG: 6.25 TABLET, FILM COATED ORAL at 08:21

## 2018-12-12 RX ADMIN — GABAPENTIN SCH MG: 300 CAPSULE ORAL at 08:25

## 2018-12-12 RX ADMIN — SACUBITRIL AND VALSARTAN SCH EA: 24; 26 TABLET, FILM COATED ORAL at 08:26

## 2018-12-12 RX ADMIN — Medication SCH MG: at 08:20

## 2018-12-12 RX ADMIN — THERA TABS SCH EACH: TAB at 08:22

## 2018-12-12 RX ADMIN — LEVOTHYROXINE SODIUM SCH MCG: 137 TABLET ORAL at 06:10

## 2018-12-12 RX ADMIN — ASPIRIN SCH MG: 325 TABLET, DELAYED RELEASE ORAL at 08:25

## 2018-12-12 RX ADMIN — FUROSEMIDE SCH MG: 40 TABLET ORAL at 08:23

## 2018-12-12 RX ADMIN — SPIRONOLACTONE SCH MG: 25 TABLET, FILM COATED ORAL at 08:22

## 2018-12-12 RX ADMIN — ENOXAPARIN SODIUM SCH MG: 100 INJECTION SUBCUTANEOUS at 08:27

## 2018-12-12 NOTE — ASMTLACE
LACE

 

Length of stay for            Answers:  7-13 days                             

current admission                                                             

Acuity / Level of             Answers:  Yes                                   

Care: Did the patient                                                         

have an inpatient                                                             

admission?                                                                    

Comorbidities - select        Answers:  Congestive heart failure              

all that apply                                                                

                                        Other                         Notes:  Graves disease; HTN

# of Emergency department     Answers:  1-2                                   

visits in the last 6                                                          

months                                                                        

Score: 12

 

Date Signed:  12/12/2018 02:48 PM

Electronically Signed By:Hawa Gtz

## 2018-12-12 NOTE — ASMTDCNOTE
Case Management Discharge

 

Discharge Order Complete?     Answers:  Yes                                   

Patient to Obtain             Answers:  Other                         Notes:  SNF

Medications                                                                   

Transportation Arranged       Answers:  Other                         Notes:  [facility]

Transport will Pick (Date     12/12/2018 04:30 PM

& Time)                       

Faxed Final Orders            Answers:  Yes                                   

Agency/Facility Transfer      Answers:  Yes                                   

Report Printed & Faxed to                                                     

Receiving Agency                                                              

Family Notified               Answers:  Yes                           Notes:  pt stated she would donald
l 

                                                                              her dtr

Discharge Comments            

Notes:

Pt to discharge to [facility]. RN given number to call report. No further CM needs noted at this 


time. CM available should needs change. 

 

Date Signed:  12/12/2018 02:52 PM

Electronically Signed By:Hawa Gtz

## 2018-12-12 NOTE — PDCARPN
Cardiology Progress Note


Assessment/Plan: 


Assessment:


1. Chronic Systolic CHF with LVEF 33%


2. NICM s/p BiV ICD


3. Acute Renal insufficiency


4. Hyponatremia


5. Pericardial Stain: stable on echo Dec 8 and 9





Plan:


-Increase Coreg to 25 mg bid


-Continue Lasix 20 mg daily


-Continue Spironolactone 25 mg daily


-Continue Entresto 


-Stable for transfer to SNF


-Follow up appointments scheduled:  


1.  Dr. Mcarthur. Monday December 17, 2018 at 2:15pm at the [facility]


2.  Wound and ICD check: Thrursday, December 20, 2018 at 2:30 pm at [facility]


3.  Dr. Mcarthur.  Monday December 24, 2018 at 11:30 Am at the [place] [facility]








12/12/18 11:22





Subjective: 





Mrs. Quiñonez is doing well this AM.  She has no complaints. She is out of bed and 

in a chair. BUN and Cr have normalized.  BP is stable and I think she can 

return to admission dose of coreg at 25 mg bid.  ICD site without hematoma or 

ecchymosis. Lower extremity edema is improving, less weeping from RLE.  I think 

she is stable for transfer to SNF today. She has follow up scheduled with Dr Mcarthur and Wound/ICD check.  


Reviewed/Discussed With: family, hospitalist


Time Spent with Patient: greater than 25 minutes


Time Spent with Patient: Greater than 25 minutes spent on this patients care, 

greater than 50% of time spent counseling, educating, and coordinating care 

regarding the above mentioned plan.


Objective: 





 Vital Signs (8 Hrs)











  Temp Pulse Resp BP Pulse Ox


 


 12/12/18 07:37  36.6 C  72  16  130/59 H  98


 


 12/12/18 04:00  36.8 C  75  17  131/62 H  94








 Intake/Output (24 Hrs)











 12/11/18 12/12/18 12/13/18





 05:59 05:59 05:59


 


Intake Total 400 1450 


 


Output Total 800 2075 100


 


Balance -400 -625 -100


 


Intake:   


 


  Oral (ml) 400 1450 


 


  IV Intake (ml) 0  


 


Output:   


 


  Urine (ml) 800 2075 100


 


    Catheter 550 1100 


 


    Incontinence 250 425 


 


    Toilet  550 100


 


Other:   


 


  Weight 118.9 kg 117.4 kg 117.4 kg


 


  Intake Quantity  Yes 





  Sufficient   


 


  Number of Voids   


 


    Bedside Commode 1 1 


 


    Incontinence  1 


 


    Toilet 2  


 


  Number of Stools   


 


    Bedside Commode  1 


 


    Toilet 1  1











Result Diagrams: 


 12/11/18 04:55





 12/12/18 06:35





- Physical Exam


Constitutional: obese


Ears, Nose, Mouth, Throat: moist mucous membranes


Cardiovascular: no murmurs, no rubs, no gallops, other (2+ pitting edema to 

knee bilaterally.  ICD site without hematoma or ecchymosis. )


Respiratory: clear to auscultate bilat


Neurologic: AAOx3, CN II-XII grossly intact


Psychiatric: cooperative, interactive, following commands





ICD10 Worksheet


Patient Problems: 


 Problems











Problem Status Onset


 


CHF (congestive heart failure) Acute  


 


Chronic Disease Mgmt/Transitional Care Acute  


 


Shortness of breath Acute  


 


Diverticulitis large intestine w/o perforation or abscess w/o bleeding Acute

## 2018-12-12 NOTE — PDIAF
- Diagnosis


Diagnosis: chf


Code Status: Full Code





- Medication Management


Discharge Medications: electronically signed and located in the Home Medication 

List.





- Orders


Services needed: Physical Therapy, Occupational Therapy


Diet Recommendation: cardiac -low fat low salt


Diet Texture: Regular Texture Diet


Additional Instructions: 


Please follow up within 3- 4 weeks of discharge with outpatient [facility] if you continue to have issues with your wounds: You may reach them at 

[phone] for an appointment and continued management of your wounds. Please 

call them asap to schedule your appointment as they fill up quickly. If before 

that time you have any issues, please follow up with your PCP.





Wound Care





Change dressings to bilateral every 2 days and as needed.  


1. Clean with normal saline and gauze, or gentle soap and water with washcloth.


2. Apply Atractain Cream or other extra moisturizing cream liberally to all 

areas of feet and legs except between toes that don't have mepilex transfer 

dressings. 


3. Skin prep divya wound


4. Wound gel to open wound beds (right leg)


5. Mepilex transfer to wound bed/open areas


6. Cover with large absorbant dressing, such as ABD pads 


7. Secure lightly with kerlix and netting. 


Elda Glez RN Wound Care Team





Follow up appointments for Cardiology:





12/20 at 2:30 pm for a wound check and device check for your pacemaker.





12/17 at 2:15 pm with Dr. Mcarthur


12/24 at 11:30 am with Dr. Mcarthur.








- Follow Up Care


Current Providers and Referrals: 


Ludwin Mcarthur MD [Medical Doctor] - 


Efraín Menjivar MD [Medical Doctor] - 


Sherine Villanueva MD [Primary Care Provider] - As per Instructions

## 2018-12-12 NOTE — ASDISCHSUM
----------------------------------------------

Discharge Information

----------------------------------------------

Plan Status:SNF                                      Medically Cleared to Leave:12/11/2018

Discharge Date:12/11/2018                            CM D/C Disposition:Skilled Nursing Facility

ADT D/C Disposition:Home, Routine, Self-Care         Projected Discharge Date:12/12/2018 01:00 PM

Transportation at D/C:Wheelchair Van                 Discharge Delay Reason:

Follow-Up Date:12/12/2018 01:00 PM                   Discharge Slot:

Final Diagnosis:CHF, EDEMA, SOB

----------------------------------------------

Placement Information

----------------------------------------------

Referral Type:*Home Health Care Services             Referral ID:[id number]

Provider Name:

Address 1:                                           Phone Number:

Address 2:                                           Fax Number:

City:                                                Selection Factors:

State:

 

Referral Type:*Nursing Home/SNF                      Referral ID:[id number]

Provider Name:[facility]

Address 1:[address]                    Phone [phone]

Address 2:                                           Fax [phone]

City:[place]                                      Selection Factors:

State:[place]

 

----------------------------------------------

Patient Contact Information

----------------------------------------------

Contact Name:FRANCY                          Relationship:Daughter

Address:[address]                            Home [phone]

                                                     Work [phone]

City:[place]                                      Alternate Phone:

State/Zip Code:[place] [address]                              Email:

----------------------------------------------

Financial Information

----------------------------------------------

Financial Class:Medicare

Primary Plan Desc:MEDICARE INPATIENT                 Primary Plan Number:[id number]

Secondary Plan Desc:ADIN/SHAHRAM SUPPLEMENT              Secondary Plan Number:[id number]

 

 

----------------------------------------------

Assessment Information

----------------------------------------------

----------------------------------------------

LACE

----------------------------------------------

LACE

 

Length of stay for            Answers:  7-13 days                             

current admission                                                             

Acuity / Level of             Answers:  Yes                                   

Care: Did the patient                                                         

have an inpatient                                                             

admission?                                                                    

Comorbidities - select        Answers:  Congestive heart failure              

all that apply                                                                

                                        Other                         Notes:  Graves disease; HTN

# of Emergency department     Answers:  1-2                                   

visits in the last 6                                                          

months                                                                        

Score: 12

 

Date Signed:  12/12/2018 02:48 PM

Electronically Signed By:Hawa Gtz

 

 

----------------------------------------------

[facility] CM Progress Note

----------------------------------------------

CM Note

 

CM Note                       

Notes:

Spoke with pt and daughter in the room. Also discussed in rounds. Pt lives with son, and is 

planning to move in with daughter later in the month. Pt admitted for SOB, CHF exacerbation, edema 

and weepy legs. PT recommending HHC or SNF. Pt has used [facility] in the past and would like to 

work with them again. Pt informed that if she later decides to go to SNF to contact CM. Pt 

comfortable with HHC. Referral sent to [facility]. CM to follow. 



D/C Plan: [facility] PT/OT/RN

 

Date Signed:  12/04/2018 02:28 PM

Electronically Signed By:Hawa Gtz

 

 

----------------------------------------------

[facility] CM Progress Note

----------------------------------------------

CM Note

 

CM Note                       

Notes:

ADDENDUM: [facility] unable to accept pt as their WC RN will be out of town. [facility] has accepted. Pt 


informed. 



D/C Plan: Home with [facility] WC RN and PT

 

Date Signed:  12/04/2018 04:26 PM

Electronically Signed By:Hawa Gtz

 

 

----------------------------------------------

[facility] CM Progress Note

----------------------------------------------

CM Note

 

CM Note                       

Notes:

12/6/2018 Case Management Note



Discussed pt during rounds this morning.  Pt to have heart cath on Friday with probable BIVAICD 

placement next week Tues or Wednesday.  Anticipating d/c towards later part of next week.



Case Management d/c poc:  [facility] RN PT



Case Management to follow.

 

Date Signed:  12/06/2018 12:16 PM

Electronically Signed By:Mari Shin RN

 

 

----------------------------------------------

[facility] CM Progress Note

----------------------------------------------

CM Note

 

CM Note                       

Notes:

Spoke with patient's daughter about d/c planning. She requested a referral to [facility]. Patient 

has been resistant to the idea of SNF but she lives alone, and her children agree that she is not 

safe to return home. Referral sent. Case Management will follow. 

 

Date Signed:  12/11/2018 10:22 AM

Electronically Signed By:Tita Muñoz RN

 

 

----------------------------------------------

[facility] CM Progress Note

----------------------------------------------

CM Note

 

CM Note                       

Notes:

12/11/2018 Case Management Note



Discussed pt during rounds this morning.  Anticipating d/c tomorrow.



Transitional Care met w/pt today for education and will follow after d/c.



Met w/pt to discuss discharge plans.  Pt in agreement that [facility] is appropriate. Updated 

[facility].



Case Management d/c poc:  [facility] rehab



Case Management to follow.

 

Date Signed:  12/11/2018 01:39 PM

Electronically Signed By:Mari Shin RN

 

 

----------------------------------------------

Case Management Discharge Plan Note

----------------------------------------------

Case Management Discharge

 

Discharge Order Complete?     Answers:  Yes                                   

Patient to Obtain             Answers:  Other                         Notes:  SNF

Medications                                                                   

Transportation Arranged       Answers:  Other                         Notes:  [facility]

Transport will Pick (Date     12/12/2018 04:30 PM

& Time)                       

Faxed Final Orders            Answers:  Yes                                   

Agency/Facility Transfer      Answers:  Yes                                   

Report Printed & Faxed to                                                     

Receiving Agency                                                              

Family Notified               Answers:  Yes                           Notes:  pt stated she would donald
l 

                                                                              her dtr

Discharge Comments            

Notes:

Pt to discharge to [facility]. RN given number to call report. No further CM needs noted at this 


time. CM available should needs change. 

 

Date Signed:  12/12/2018 02:52 PM

Electronically Signed By:Hawa Gtz

 

 

----------------------------------------------

Intervention Information

----------------------------------------------

Intervention Type:*Incorrect Registration            Date of Service:12/04/2018 08:39 AM

Patient Type:Observation                             Staff Member:Kae Solares

Hours:                                               Discipline:

Severity:                                            Comment:

Intervention Type:*IM-Signed                         Date of Service:12/12/2018 01:55 PM

Patient Type:Inpatient                               Staff Member:Marianna Burrows

Hours:                                               Discipline:

Severity:                                            Comment:

## 2018-12-12 NOTE — PDDCSUM
Discharge Summary


Discharge Summary: 


78-year-old with a history of chronic systolic heart failure with LVEF of 33% 

and obstructive sleep apnea on CPAP is admitted with increasing lower extremity 

edema and weeping from Cardiology Clinic.  She had been on a Lasix drip from 

December 3rd through 6th and had a bump in creatinine and has been off of Lasix 

since then.  Her creatinine is back to baseline. BP is stable. Edema is better. 





Med Mgt:


-Increase Coreg to 25 mg bid


-Continue Lasix 20 mg daily


-Continue Spironolactone 25 mg daily


-Continue Entresto 








-Follow up appointments scheduled:  


1.  Dr. Mcarthur. Monday December 17, 2018 at 2:15pm at the [place] [facility]


2.  Wound and ICD check: Thrursday, December 20, 2018 at 2:30 pm at [place] 

[facility]


3.  Dr. Mcarthur.  Monday December 24, 2018 at 11:30 Am at the [place] office











Discharge Diagnosis:





#Chronic Systolic CHF with LVEF 33%


#NICM s/p BiV ICD


#Acute Renal insufficiency, back to baseline


# Hyponatremia, resolved


# Pericardial Stain: stable on echo Dec 8 and 9


#Obesity


#  chronic edema with venous stasis wounds bilaterally.


* Will need ongoing wound care post hospitalization


* Likely exacerbated by congestive heart failure





#  obstructive sleep apnea on CPAP





#  hypothyroid, on Synthroid





#  Intertriginous  candidiasis on nystatin powder.





EXAM:


NAD


AAOX3


RRR


DECREASED LUNG SOUNDS





MEDS: SEE MED REC





F/U: WITH PCP AND CARDIOLOGY PER APPTS ABOVE





TOTAL TIME SPEN ON D/C IS 35 MINS

## 2019-01-30 ENCOUNTER — HOSPITAL ENCOUNTER (INPATIENT)
Dept: HOSPITAL 80 - FED | Age: 79
LOS: 8 days | Discharge: NURSING, REHAB OR LONG-TERM CARE | DRG: 603 | End: 2019-02-07
Attending: INTERNAL MEDICINE | Admitting: INTERNAL MEDICINE
Payer: COMMERCIAL

## 2019-01-30 DIAGNOSIS — I87.8: ICD-10-CM

## 2019-01-30 DIAGNOSIS — B95.61: ICD-10-CM

## 2019-01-30 DIAGNOSIS — E87.1: ICD-10-CM

## 2019-01-30 DIAGNOSIS — T50.1X5A: ICD-10-CM

## 2019-01-30 DIAGNOSIS — I13.0: ICD-10-CM

## 2019-01-30 DIAGNOSIS — E66.2: ICD-10-CM

## 2019-01-30 DIAGNOSIS — I50.22: ICD-10-CM

## 2019-01-30 DIAGNOSIS — B96.5: ICD-10-CM

## 2019-01-30 DIAGNOSIS — N18.3: ICD-10-CM

## 2019-01-30 DIAGNOSIS — I25.10: ICD-10-CM

## 2019-01-30 DIAGNOSIS — Z95.810: ICD-10-CM

## 2019-01-30 DIAGNOSIS — L03.115: Primary | ICD-10-CM

## 2019-01-30 DIAGNOSIS — E03.9: ICD-10-CM

## 2019-01-30 DIAGNOSIS — E87.5: ICD-10-CM

## 2019-01-30 DIAGNOSIS — L27.0: ICD-10-CM

## 2019-01-30 DIAGNOSIS — Z87.891: ICD-10-CM

## 2019-01-30 DIAGNOSIS — J96.10: ICD-10-CM

## 2019-01-30 DIAGNOSIS — Z85.3: ICD-10-CM

## 2019-01-30 LAB
INR PPP: 1.14 (ref 0.83–1.16)
PLATELET # BLD: 273 10^3/UL (ref 150–400)
PROTHROMBIN TIME: 14.8 SEC (ref 12–15)

## 2019-01-30 PROCEDURE — C1751 CATH, INF, PER/CENT/MIDLINE: HCPCS

## 2019-01-30 RX ADMIN — ACETAMINOPHEN SCH MG: 500 TABLET ORAL at 18:14

## 2019-01-30 RX ADMIN — NYSTATIN SCH APP: 100000 POWDER TOPICAL at 22:48

## 2019-01-30 RX ADMIN — PRAMIPEXOLE DIHYDROCHLORIDE SCH MG: 1 TABLET ORAL at 21:41

## 2019-01-30 RX ADMIN — OXYCODONE HYDROCHLORIDE PRN MG: 15 TABLET ORAL at 23:25

## 2019-01-30 RX ADMIN — GABAPENTIN SCH MG: 300 CAPSULE ORAL at 18:14

## 2019-01-30 RX ADMIN — HYDROCORTISONE SCH APP: 25 CREAM TOPICAL at 22:48

## 2019-01-30 RX ADMIN — CARVEDILOL SCH MG: 6.25 TABLET, FILM COATED ORAL at 18:14

## 2019-01-30 RX ADMIN — ACETAMINOPHEN SCH MG: 500 TABLET ORAL at 21:42

## 2019-01-30 RX ADMIN — OXYCODONE HYDROCHLORIDE PRN MG: 15 TABLET ORAL at 13:25

## 2019-01-30 RX ADMIN — ASPIRIN SCH MG: 325 TABLET, DELAYED RELEASE ORAL at 09:54

## 2019-01-30 RX ADMIN — SODIUM HYPOCHLORITE SCH APP: 1.25 SOLUTION TOPICAL at 22:49

## 2019-01-30 RX ADMIN — Medication SCH MLS: at 14:32

## 2019-01-30 RX ADMIN — GABAPENTIN SCH MG: 300 CAPSULE ORAL at 21:41

## 2019-01-30 RX ADMIN — GABAPENTIN SCH MG: 300 CAPSULE ORAL at 09:54

## 2019-01-30 RX ADMIN — ENOXAPARIN SODIUM SCH MG: 100 INJECTION SUBCUTANEOUS at 09:54

## 2019-01-30 RX ADMIN — OXYCODONE HYDROCHLORIDE PRN MG: 15 TABLET ORAL at 09:56

## 2019-01-30 RX ADMIN — Medication SCH MLS: at 21:42

## 2019-01-30 NOTE — EDPHY
H & P


Time Seen by Provider: 01/30/19 05:38


HPI/ROS: 





HPI





CHIEF COMPLAINT:  Fever, and worsening right leg wound.





HISTORY OF PRESENT ILLNESS:  78-year-old female, presents emergency room from 

[facility] for concern of sepsis and fever.  The patient apparently was 

at her private residence and had a fall today and 911 was called for a lift 

assist.  She states she was too weak to get off the ground it was decided by 

the family that she go back to her living facility at [facility].  She 

went there yesterday and at some point they checked her blood pressure and got 

a low reading and was concerned that she had a fever was concerned that she may 

be septic.  They made the decision to transport her earlier this morning to the 

emergency room.


She now arrives to the emergency room and states she really does not have any 

complaints, however her right lower extremity is weeping edema with yellow 

discharge concerning for infection and erythematous.


Past Medical History:  Significant medical history for CHF with a low ejection 

fraction, obstructive sleep apnea, obesity, chronic lower extremity edema, 

nonischemic cardiomyopathy, hyponatremia





Past Surgical History:  No recent surgery.





Social History:  Denies drugs alcohol tobacco.





Family History:  Noncontributory








ROS   


REVIEW OF SYSTEMS:


10 Systems were reviewed and negative with the exception of the elements 

mentioned in the history of present illness.








Exam   


Constitutional obese, nontoxic   triage nursing summary reviewed, vital signs 

reviewed, awake/alert.  Vital signs stable and afebrile here


Eyes   normal conjunctivae and sclera, EOMI, PERRLA. 


HENT   normal inspection, atraumatic, moist mucus membranes, no epistaxis, neck 

supple/ no meningismus, no raccoon eyes. 


Respiratory   clear to auscultation bilaterally, normal breath sounds, no 

respiratory distress, no wheezing. 


Cardiovascular   rate normal, regular rhythm, no murmur, no edema, distal 

pulses normal. 


Gastrointestinal   soft, non-tender, no rebound, no guarding, normal bowel 

sounds, no distension, no pulsatile mass. 


Genitourinary   no CVA tenderness. 


Musculoskeletal bilateral lower extremity significant edema, worse on the right 

leg than the left leg.


Right lower extremity:  Weeping edema, erythematous, warm, yellow discharge.  

Ulcerated lesions throughout the leg.





 no midline vertebral tenderness, full range of motion, no calf swelling, no 

tenderness of extremities, no meningismus, good pulses, neurovascularly intact.


Skin   pink, warm, & dry, no rash, skin atraumatic. 


Neurologic   awake, alert and oriented x 3, AAOx3, moves all 4 extremities 

equally, motor intact, sensory intact, CN II-XII intact, normal cerebellar, 

normal vision, normal speech. 


Psychiatric   normal mood/affect. 


Heme/Lymph/Immune   no lymphadenopathy.





Differential Diagnosis:  Includes but is not limited to in a particular order 

sepsis, bacteremia, bloodstream infection, cellulitis, chronic leg wounds.





Medical Decision Making:  Plan for this patient IV establishment, hold IV fluids

, check lactic acid, CBC, chemistry, chest x-ray, urinalysis, blood cultures.  

Plan for hospital admission.





Re-evaluation:











X-ray of the chest one view reviewed by myself.  Shows cardiomegaly, AICD in 

appropriate position.  No focal pneumonia visualized.








Given the patient's right lower extremity redness, yellow discharge, ulcerations

, weeping edema this is most likely the cause of her infection.  Blood cultures 

have been obtained


Lactic acid is less than 2.





She does have an elevated white blood cell count systemically.





Plan for hospital admission.  I have ordered the patient cefazolin IV.





I will ask the hospitalist service to admit her for worsening right lower 

extremity wounds right lower extremity cellulitis.  





The initial chemistry showed a potassium of 6.3.  However this was EMS blood in 

this concern for hemolysis.





Will send a 2nd chemistry.


Patient will get a 500 cc fluid bolus.


EKG will be obtained for hyperkalemia.





EKG interpretation by me on record in [employer] system.  Impression time of 

EKG 6:18 a.m., ventricularly paced.  Without any signs of acute ischemia.





Repeat chemistry shows improved potassium of 5.5.


Patient is being gently hydrated this time.





Patient be admitted to the hospitalist service Dr. Sepulveda has accepted.


Source: Patient, EMS





- Medical/Surgical History


Hx Asthma: No


Hx Chronic Respiratory Disease: Yes


Hx Diabetes: No


Hx Cardiac Disease: Yes


Hx Renal Disease: No


Hx Cirrhosis: No


Hx Alcoholism: No


Hx HIV/AIDS: No


Hx Splenectomy or Spleen Trauma: No


Other PMH: CHF with ejection fraction 30%, home oxygen, Graves disease, obesity

, hypertension; apnea with cpap





- Social History


Smoking Status: Former smoker (stopped smoking in 21 years ago, smoked 1 ppd 

for about 40 years)


Constitutional: 


 Initial Vital Signs











Temperature (C)  36.7 C   01/30/19 05:38


 


Heart Rate  98   01/30/19 05:38


 


Respiratory Rate  20   01/30/19 05:38


 


Blood Pressure  94/38 L  01/30/19 05:38


 


O2 Sat (%)  95   01/30/19 05:38








 











O2 Delivery Mode               Nasal Cannula


 


O2 (L/minute)                  2














Allergies/Adverse Reactions: 


 





miconazole [From Neosporin AF] Allergy (Unknown, Verified 01/30/19 05:37)


 Unknown


amoxicillin [From Augmentin] Allergy (Verified 01/30/19 05:37)


 


clavulanic acid [From Augmentin] Allergy (Verified 01/30/19 05:37)


 








Home Medications: 














 Medication  Instructions  Recorded


 


Gabapentin [Neurontin] 600 mg PO TID 09/24/10


 


Levothyroxine [Synthroid 137 mcg 137 mcg PO DAILY06 09/24/10





(*)]  


 


Aspirin EC [Aspirin  mg (*)] 325 mg PO DAILY 11/11/17


 


Bupropion HCl [Wellbutrin Xl] 300 mg PO DAILY 11/11/17


 


Carvedilol [Coreg (*)] 25 mg PO BIDMEAL 11/11/17


 


Multivitamins [Multivitamin (*)] 1 each PO DAILY 11/11/17


 


Pramipexole Di-HCl [Mirapex 1 mg 2 mg PO HS 11/11/17





(*)]  


 


Sacubitril/Valsartan 24/26Mg 1 tab PO BID 11/11/17





[Entresto 24 mg/26 mg (RX)]  


 


Spironolactone [Aldactone 25 MG 25 mg PO DAILY 11/11/17





(*)]  


 


Nitroglycerin [Nitrostat 0.4 mg 0.4 mg SL PRN PRN #30 btl 12/12/18





(*)]  


 


Acetaminophen [Tylenol  mg 1,000 mg PO TID 01/30/19





(*)]  


 


Hydrocortisone 2.5% 1 xenia TP BID 01/30/19





[Hydrocortisone 2.5% cream (*)]  


 


Loperamide HCl [Imodium 2 mg (*)] 2 mg PO Q8HRS PRN 01/30/19


 


Nystatin Powder [Mycostatin Powder 1 xenia TP BID 01/30/19





(RX)]  


 


Potassium Cl [Klor-Con 20 meq (*)] 20 meq PO DAILY 01/30/19


 


Torsemide [Demadex] 100 mg PO DAILY 01/30/19


 


guaiFENesin [Mucinex 600 MG (*)] 600 mg PO BID PRN 01/30/19


 


oxyCODONE IR [Oxycodone Ir (*)] 5 - 10 mg PO Q4HRS PRN 01/30/19














Medical Decision Making





- Diagnostics


Imaging Results: 


 Imaging Impressions





Chest X-Ray  01/30/19 05:37


Impression:  


1. No pneumonia or effusion.


2. Chronic airways disease and mild cardiomegaly unchanged.








Extremity Venous Study  01/30/19 10:17


Impression: 


1. Nonspecific right groin lymph node.


2. No major deep venous thrombosis right leg.


 


 














- Data Points


Laboratory Results: 


 Laboratory Results





 01/30/19 05:30 





 01/30/19 06:25 








Medications Given: 


 





Acetaminophen (Tylenol)  1,000 mg PO TID [facility]


   Stop: 07/29/19 15:59


   Last Admin: 01/30/19 21:42 Dose:  1,000 mg


Aspirin Buffered (Aspirin Ec)  325 mg PO DAILY [facility]


   Stop: 07/29/19 09:44


   Last Admin: 01/30/19 09:54 Dose:  325 mg


Bupropion HCl (Wellbutrin Xl)  300 mg PO DAILY [facility]


   Stop: 07/29/19 09:44


   Last Admin: 01/30/19 09:53 Dose:  300 mg


Carvedilol (Coreg)  6.25 mg PO BIDMEAL [facility]


   Stop: 07/29/19 17:59


   Last Admin: 01/30/19 18:14 Dose:  6.25 mg


Enoxaparin Sodium (Lovenox)  40 mg SC DAILY [facility]


   Stop: 07/29/19 09:29


   Last Admin: 01/30/19 09:54 Dose:  40 mg


Gabapentin (Neurontin)  600 mg PO TID [facility]


   Stop: 07/29/19 09:44


   Last Admin: 01/30/19 21:41 Dose:  600 mg


Cefazolin Sodium/Dextrose (Ancef)  100 mls @ 200 mls/hr IV Q8HRS PRETTY


   PRN Reason: Protocol


   Stop: 03/01/19 13:59


   Last Admin: 01/30/19 21:42 Dose:  100 mls


Oxycodone HCl (Oxycodone Ir)  5 - 10 mg PO Q4HRS PRN


   PRN Reason: Pain, Breakthrough


   Stop: 02/09/19 09:22


   Last Admin: 01/30/19 13:25 Dose:  10 mg


Pramipexole Dihydrochloride (Mirapex)  2 mg PO HS PRETTY


   Stop: 07/29/19 20:59


   Last Admin: 01/30/19 21:41 Dose:  2 mg





Discontinued Medications





Fentanyl (Sublimaze)  50 mcg IVP EDNOW ONE


   Stop: 01/30/19 06:41


   Last Admin: 01/30/19 06:41 Dose:  50 mcg


Cefazolin Sodium/Dextrose (Ancef)  100 mls @ 200 mls/hr IV EDNOW ONE


   PRN Reason: Protocol


   Stop: 01/30/19 06:20


   Last Admin: 01/30/19 06:13 Dose:  100 mls


Sodium Chloride (Ns)  500 mls @ 0 mls/hr IV ONCE ONE


   PRN Reason: Wide Open


   Stop: 01/30/19 06:05


   Last Admin: 01/30/19 06:07 Dose:  500 mls








Departure





- Departure


Disposition: [facility]


Clinical Impression: 


 Dehydration





Cellulitis


Qualifiers:


 Site of cellulitis: extremity Site of cellulitis of extremity: lower extremity 

Laterality: right Qualified Code(s): L03.115 - Cellulitis of right lower limb





Condition: Fair

## 2019-01-30 NOTE — GCON
[f rep st]



                                                                    CONSULTATION





CONGESTIVE HEART FAILURE CONSULT



DATE OF CONSULTATION:  01/30/2019





REASON FOR CONSULTATION:  Evaluate woman with near syncope, weakness, fatigue, and chronic severe leg
 edema.



HISTORY OF PRESENT ILLNESS:  The patient is a 78-year-old woman with multiple medical problems.  From
 a heart standpoint, she has coronary artery disease with a known chronic total RCA and chronic ische
hardy systolic heart failure with an LVEF of 33%, and a biventricular AICD placed in December 2018 with
 a chronic left bundle branch block.  Her last echocardiogram in December 2018 demonstrated an LVEF o
f 33%.  A left and right heart catheterization in December 2018 demonstrated 100% occluded right daljit
nary artery, and mild to moderate LAD and left circumflex disease with an LVEDP of 32.  Her mean RA w
as 21, PA 52/23 with a mean pulmonary artery pressure of 37, and a mean pulmonary capillary wedge pre
ssure of 21.  She also has severe chronic venous insufficiency with weeping edema in [facility].  S
he was brought in from her skilled nursing facility with near syncope, hypotension, and fevers with c
oncern for cellulitis.  Her weight is down 7 pounds.  She denies chest pain, PND, or syncope.



PAST MEDICAL HISTORY:  Chronic morbid obesity, chronic ischemic systolic heart failure with an LVEF o
f 33%, chronic renal insufficiency, previous breast cancer status post bilateral mastectomy, chronic 
venous insufficiency, coronary artery disease as per HPI, diabetes mellitus, and hypothyroidism.



PAST SURGICAL HISTORY:  Bilateral mastectomies and biventricular AICD.



HOME MEDICATIONS:  Aspirin 325 mg per day, Neurontin 600 mg three times daily, Wellbutrin  mg d
aily, Coreg 25 mg twice daily, Entresto 24/26 mg twice daily, Aldactone 25 mg per day, and Demadex 10
0 mg per day.



ALLERGIES:  As per documentation.



SOCIAL HISTORY:  The patient denies tobacco or excessive alcohol intake.



FAMILY HISTORY:  Positive for coronary artery disease.



REVIEW OF SYSTEMS:  The patient reports no GI bleed symptoms such as hematemesis, melena, or bright r
ed blood per rectum.  She has no TIA or CVA symptoms.  Rest of 10-point review of systems is negative
.



PHYSICAL EXAM:  VITAL SIGNS:  Weight 109.3 kg, pulse 98, blood pressure 142/49, respirations 20, and 
on 2 L/minute 96%.  GENERAL:  A morbidly obese woman in no acute distress without chest pain or using
 accessory respiratory muscles.  EYES:  Pupils equal and reactive to light. ENT:  Oral mucosa with no
 cyanosis.  NECK:  Jugular venous pressure to 7 cm.  Carotid pulses 2+ bilaterally with no obvious br
uits.  No thyromegaly noted.  LUNGS:  Clear to auscultation bilaterally without rales, rhonchi, or wh
eezing.  HEART:  Normal PMI.  Regular rate and rhythm with a 1/6 nonradiating systolic murmur and no 
S3.  ABDOMEN:  Soft and nontender.  No guarding or rebound.  No obvious ascites.  EXTREMITIES:  1+ pe
ripheral pulses including femoral and pedal pulses, 2+ to 3+ edema to the level just below the knees.
  MUSCULOSKELETAL:  No scoliosis or nuchal rigidity.  SKIN:  Erythema and redness over the lower extr
emities with no obvious cyanosis.  NEUROLOGICAL:  Normal affect and mood.



LABS:  White count 15.0, hematocrit 34, platelets 273,000, and MCV 95.  Sodium 129, potassium 5.5, ch
loride 100, bicarbonate 24, BUN 55, creatinine 1.1, and glucose 371.  NT proBNP level 760.  INR 1.14.




IMPRESSION:  A 78-year-old woman with multiple medical problems.  From a heart standpoint, she has co
ronary artery disease with known chronically 100% occluded right coronary artery and chronic ischemic
 systolic heart failure with a left ventricular ejection fraction of 33%, and an automatic implantabl
e cardioverter-defibrillator in place with class 3 [employer] symptoms.  I wonder if 
her left ventricular function has gotten better with congestive heart failure medications and cardiac
 resynchronization therapy pacing.  She may be over diuresed now contributing to her weakness and hyp
otension.  I think her edema is in large part from venous insufficiency, not related to volume overlo
ad.



RECOMMENDATIONS:  

1.  Agree with stopping Entresto, Aldactone, and Demadex for now.

2.  Would place on Coreg 6.25 mg twice daily.

3.  We will repeat an echocardiogram to see if her LV function has improved and in particular look at
 her RV function.

4.  I suspect we will probably need to restart on some degree of Demadex and Aldactone, but could be 
at a very lower dose.

5.  Agree with wound care as I think her leg edema issues are out of proportion to her heart failure.
 

Thank you for allowing me to participate in the care of this patient.  I will follow along closely wi
th you during her hospitalization.





Job #:  [id number]

## 2019-01-30 NOTE — CPEKG
Test Reason : OPEN

Blood Pressure : ***/*** mmHG

Vent. Rate : 099 BPM     Atrial Rate : 097 BPM

   P-R Int : 104 ms          QRS Dur : 181 ms

    QT Int : 407 ms       P-R-T Axes : 080 176 017 degrees

   QTc Int : 523 ms

 

Ventricular-paced complexes

 

Confirmed by Maria Del Rosario Beaulieu ([id number]) on 1/30/2019 6:11:39 PM

 

Referred By: Raoul Duncan           Confirmed By:Maria Del Rosario Beaulieu

## 2019-01-30 NOTE — SOAPPROG
SOAP Progress Note


Assessment/Plan: 


Assessment:








CHF consultation performed and dictated.





79 y/o woman with multiple medical issues including: CAD with known  of RCA, 

chronic ischemic systolic CHF with LVEF 33% s/p CRT-D in 12/19, chronic morbid 

obesity, CRI, IDDM and chronic venous insufficiency admitted with near syncope, 

hypotension, fevers and concern for leg cellulitis. Clinically I wonder if her 

LVEF has improved with CHF meds and biventricular pacing and she is 

overdiuresed contributing to her tiredness, electrolyte and renal abnormalities 

and near syncope. 





REC:





1)restart Coreg to 6.25mg PO BID.


2)continue to hold Demadex, Aldactone and Entresto


3)echo this afternoon to reassess LVEF and RVEF


4)interrogate her AICD for any VT/Vfib or afib.


5)follow weight daily. Probably restart on some diuretics but at less dose soon.

















01/30/19 12:48





Objective: 





 Vital Signs











Temp Pulse Resp BP Pulse Ox


 


 36.8 C   98   17   142/49 H  96 


 


 01/30/19 10:56  01/30/19 10:56  01/30/19 10:56  01/30/19 10:56  01/30/19 10:56








 











 01/29/19 01/30/19 01/31/19





 05:59 05:59 05:59


 


Intake Total   1500


 


Output Total   200


 


Balance   1300








 











PT  14.8 SEC (12.0-15.0)   01/30/19  05:30    


 


INR  1.14  (0.83-1.16)   01/30/19  05:30    














ICD10 Worksheet


Patient Problems: 


 Problems











Problem Status Onset


 


Cellulitis Acute  


 


Dehydration Acute  


 


CHF (congestive heart failure) Acute  


 


Chronic Disease Mgmt/Transitional Care Acute  


 


Diverticulitis large intestine w/o perforation or abscess w/o bleeding Acute  


 


Shortness of breath Acute

## 2019-01-30 NOTE — ASMTCMCOM
CM Note

 

CM Note                       

Notes:

Chart reviewed for discharge planning purposes. 78 year old female admitted with hypotension s/p 

fall at her home at [facility]. She was admitted through the ED with concerns of sepsis and 


leg wound. Therapy evaluations pending. CM to follow for needs.



Plan: TBD

 

Date Signed:  01/30/2019 10:49 AM

Electronically Signed By:Sobeida Brambila RN

## 2019-01-30 NOTE — WOCRNPDOC
[facility] Advanced Assessment Note





- Skin Integrity Problem, Advanced Assess


  ** Left Lower Leg


Dressing Type: Open to Air


Brianna Wound Tissue: Erythema, Rubor (non dependent)


Pulse Location & Description: 1+ bilateral DP


Extremity Temperature: Warm


Skin Integrity Problem Comment: Leg does not have any open wounds at this time. 

Erythematic along gaiter area to metatarsal heads. Cluster of papules along 

anterior proximal lower leg that seem to be an extension of the rash the 

patient has on her back, arms, torso and thighs. Non dependent rubor around 

malleolus. Dried skin flaking off much of the lower leg. The flaking skin 

appears to have zinc tint to it. Patient reports having had unna boots placed 

bilaterally at outpatient [facility] recently without any improvement. Patient's 

daughter gave wound RN extensive history of patient's wounds which stemmed from 

a bone removal in her foot and the compression that was initiated after the 

removal last year. Then a few months ago the patient became quite edematous due 

to CHF and began weeping large amounts of fluid from her lower legs. Per the 

daughter the wounds have continued to worsen since then. Discussed patient's 

care with [facility] Per her report homecare 

nursing has found patient to be non compliant with both wound care and PT/OT 

recc's at home, as well as there being some other cleanliness/care issues. 

Patient has declined services including MSW. There is no record of the patient 

having any GWEN's done to ensure there is no arterial component that may be 

contributing to the wounds worsening with compression, so if this could be done 

during inpt stay it would be helpful. Curtis JUAREZ in room for care.





  ** Right Lower Leg


Dressing Type: Open to Air


Exudate Amount: Minimal


Exudate Characteristic(s): Serous


Brianna Wound Tissue: Erythema, Painful/Tender


Site Measurement - Head-to-Toe Length X Width X Depth (cm): 28x35 (entire 

circumference of right lower leg)x0.2


Pulse Location & Description: 1+ DP


Extremity Temperature: Warm


Skin Integrity Problem Comment: Flushed leg with ns. There was some xeroform 

with ABD adhered to patient's calf. This had a greenish tinged drainage similar 

to that caused by pseudomonas. Culture has been sent. Will not compress legs 

while patient is inpatient. Will initiate BID 1/4 strength Dakins dressing 

changes for it's bacteriacidal and mechanical debridement properties. Reported 

findings to Dr. Lorenzo via Sarah. Wound care will follow.

## 2019-01-30 NOTE — HOSPPROG
Hospitalist Progress Note


Assessment/Plan: 





*  RLE cellulitis - superimposed on known venous stasis


   -IV ancef


   -check US rule out DVT


*  Hyperkalemia - potassium 6.3


   -now improved


   -hold Entresto, Spironolactone and KCl


   -cards consult to reconsider CHF med regimen


*   Rash - suspect drug rash


   -newest med is Demadex


   -hold - reconsider diuretic


*  Hypotension


   -holding meds


*  Chronic systolic CHF - EF 33% - s/p BiV ICD


   -cards to see - d/w Jody Ceron


*  Morbid obesity BMI 45 - with TIMI/OHS


*  Chronic respiratory failure due to above


   -baseline 2L


*  CKD


   -at baseline


*  Hyponatremia


   -fluid restrict














Subjective: No new complaints.    Rash has been worsening over 1-2 weeks per 

daughter


Objective: 


 Vital Signs











Temp Pulse Resp BP Pulse Ox


 


 36.9 C   69   18   102/69   95 


 


 01/30/19 07:25  01/30/19 07:25  01/30/19 07:25  01/30/19 07:25  01/30/19 07:25








 Laboratory Results





 01/30/19 06:25 





 











 01/29/19 01/30/19 01/31/19





 05:59 05:59 05:59


 


Intake Total   500


 


Output Total   0


 


Balance   500








 











PT  14.8 SEC (12.0-15.0)   01/30/19  05:30    


 


INR  1.14  (0.83-1.16)   01/30/19  05:30    








CXR - negative


EKG - paced





- Physical Exam


Constitutional: no apparent distress, appears nourished, not in pain


Cardiovascular: regular rate and rhythym, no murmur, rub, or gallop


Respiratory: no respiratory distress, no rales or rhonchi, clear to auscultation


Gastrointestinal: normoactive bowel sounds, soft, non-tender abdomen, no 

palpable masses


Skin: rash, other (chronic venous stasis with swelling, weeping and increased 

erythema to RLE)


Musculoskeletal: full muscle strength, no muscle tenderness, normal joint ROM


Neurologic: AAOx3, sensation intact bilaterally


Psychiatric: interacting appropriately, not anxious, not encephalopathic, 

thought process linear





ICD10 Worksheet


Patient Problems: 


 Problems











Problem Status Onset


 


Cellulitis Acute  


 


Dehydration Acute  


 


CHF (congestive heart failure) Acute  


 


Chronic Disease Mgmt/Transitional Care Acute  


 


Diverticulitis large intestine w/o perforation or abscess w/o bleeding Acute  


 


Shortness of breath Acute

## 2019-01-30 NOTE — ECHO
[email or web address]





[address] [address] 

Main: [phone] 



Fax: 



Transthoracic Echocardiogram 

Name:             OLIVIA CYR                             MR#:

[id number]

Study Date:       2019                              Study Time:

01:14 PM

YOB: 1940                              Age:

78 year(s)

Height:           154.9 cm (61 in.)                       Weight:

109.32 kg (241 lb.)

BSA:              2.04 m2                                 Gender:

Female

Examination:      Echo                                    Indication:

CHF now s/p crt-d and near syncope/reassess



LVEF and RVEF 

Image Quality:    Technically Difficult                   Contrast: 

Requested by:     Ludwin Mcarthur                             BP:

142 mmHg/49 mmHg

Heart Rate:                                               Rhythm: 

Indication:       CHF now s/p crt-d and near syncope/reassess LVEF and

RVEF



Procedure Staff 

Ultrasound Technician:   Becky Chaudhari [facility] 

Reading Physician:       Blanco Dumont MD 

Requesting Provider: 



Conclusions:          Mildly to moderately dilated left ventricle.  

The ejection fraction is estimated to be 30-35 %.  

Diastolic dysfunction is present. .  

There is a pacemaker lead noted in the right ventricle.  

The left atrium is mildly dilated.  

There is mild thickening of the mitral valve leaflets.  

Mild to moderate mitral regurgitation.  

Mild tricuspid regurgitation is present.  

RVSP is 41mmHG..  

Trivial anterior pericardial effusion.  

No signficant change compared to 2018. 



Measurements: 

Chambers                    Valvular Assessment AV/MV

Valvular Assessment TV/PV



Normal                                   Normal

Normal

Name         Value     Range              Name         Value Range

Name           Value Range

Ao Amanda (MM): 2.9 cm    (2.2 cm-3.7            AV Vmax:     1.70 m/s (1

m/s-1.7       TR Vmax:       3.01 mm/s ( - )



cm)                                  m/s)             TR PGmax:

36 mmHg ( - )

IVSd (2D):   0.8 cm (0.6 cm-1.1               AV meanP mmHg ( -

)          syst. PAP: 41 mmHg  ( - )



cm)                LIZ (VTI):   1.5 cm ( - )  

LVDd (2D):   5.8 cm    (3.9 cm-5.3            MV E Vmax:   1.04 m/s (

- )



cm)                MV A Vmax:   1.60 m/s ( - )  

LVPWd (2D):  0.8 cm    ( - )              MV E/A:      0.65 ( - )  

LVOTd        1.9 cm    1.9 cm mm              MV meanP mmHg ( -

)

EF Range:    30-35 %                          MVA (Vmax):  2.1 m/s ( -

)



Continued Measurements: 

Chambers                    Valvular Assessment AV/MV

Valvular Assessment TV/PV



Patient: OLIVIA CYR                          MRN: [id number]

Study Date: 2019   Page 1 of 2

01:14 PM 









Name                       Value  Name                      Value

Name                      Value

LADs:                    4.5 cm               MV Annulus:

2.9 cm     CVP (est.):             5 mmHg

LADs Lon.0 cm               MV E' Septal:

0.07  m/s

LA Area:                 26.8 cm2         MV E/E' Septal:

14.10



MV E/E' Lateral:        16.90    

MV VTI:                 25.20 cm    



Additional Vessels  



Name                       Value  

Ao Ascendin.9 cm    



Findings:             Left Ventricle: 

Mildly to moderately dilated left ventricle. No LV hypertrophy. The

ejection fraction is estimated to be

30-35 %. Diastolic dysfunction is present. . Unable to assess regional

wall motion abnormalities.

Right Ventricle: 

Normal size right ventricle. Normal RV function. There is a pacemaker

lead noted in the right

ventricle.  

Left Atrium: 

The left atrium is mildly dilated.  

Right Atrium: 

The right atrium is normal in size.  

Mitral Valve: 

There is mild thickening of the mitral valve leaflets. Mild to

moderate mitral regurgitation.

Aortic Valve: 

The aortic valve is normal in appearance and function.  

Tricuspid Valve: 

The tricuspid valve is normal in appearance and function. Mild

tricuspid regurgitation is present. The

pulmonary artery pressure is mildly increased. RVSP is 41mmHG..  

Pulmonic Valve: 

Pulmonary valve not well visualized.  

Aorta: 

The aorta is normal.  

Pericardium: 

Trivial anterior pericardial effusion. There is pericardial fat.  

Exam Comments: 

Pt flat for echo (red hot painful legs). 







Electronically signed by Blanco Dumont MD on 2019 at 04:20 PM 

(No Signature Object) 



Patient: OLIVIA CYR                          MRN: [id number]

Study Date: 2019   Page 2 of 2

01:14 PM 







[id number]

## 2019-01-30 NOTE — PDGENHP
History and Physical





- Chief Complaint


Weakness





- History of Present Illness


77 yo F w/ hx of NICM s/p AICD, morbid obesity, chronic venous stasis dermatitis

, and CKD presents with generalized weakness and concern for infection. The 

patient was recently in a rehab facility. She returned home and suffered a 

minor fall where she could not get back up. As a result she was sent back to 

rehab. In rehab her BP was low/normal and she was noted to be febrile so she 

was sent to the [facility] ED. In our ED her BP remains borderline and her RLE is 

erythematous and warm. She is complaining of RLE pain and denies other 

infectious symptoms. Her laboratory work-up notable for leukocytosis and known 

CKD. She is being admitted for treatment of suspected cellulitis.





Case discussed with ED physician Dr. Rubi; records reviewed and summarized 

above.





History Information





- Allergies/Home Medication List


Allergies/Adverse Reactions: 








miconazole [From Neosporin AF] Allergy (Unknown, Verified 01/30/19 05:37)


 Unknown


amoxicillin [From Augmentin] Allergy (Verified 01/30/19 05:37)


 


clavulanic acid [From Augmentin] Allergy (Verified 01/30/19 05:37)


 





Home Medications: 








Gabapentin [Neurontin] 600 mg PO TID 09/24/10 [Last Taken 12/03/18 08:00]


Levothyroxine [Synthroid 137 mcg (*)] 137 mcg PO DAILY06 09/24/10 [Last Taken 12 /03/18]


Aspirin EC [Aspirin  mg (*)] 325 mg PO DAILY 11/11/17 [Last Taken 12/03/18

]


Bupropion HCl [Wellbutrin Xl] 300 mg PO DAILY 11/11/17 [Last Taken 12/03/18]


Calcium Carbonate/Vitamin D3 [Oyster Shell Calcium-Vit D Tab] 1 tab PO DAILY 11/ 11/17 [Last Taken 11/10/17]


Carvedilol [Coreg (*)] 25 mg PO BIDMEAL 11/11/17 [Last Taken 12/03/18 08:00]


Herbals/Supplements -Info Only 1 ea PO DAILY 11/11/17 [Last Taken Unknown]


Multivitamins [Multivitamin (*)] 1 each PO DAILY 11/11/17 [Last Taken 11/10/17]


Pramipexole Di-HCl [Mirapex 1 mg (*)] 1 mg PO HS PRN 11/11/17 [Last Taken 11/10/

17]


Sacubitril/Valsartan 24/26Mg [Entresto 24 mg/26 mg (RX)] 1 tab PO BID 11/11/17 [

Last Taken 12/03/18 08:00]


Spironolactone [Aldactone 25 MG (*)] 25 mg PO DAILY 11/11/17 [Last Taken 12/03/ 18]





I have personally reviewed and updated: family history, medical history





- Past Medical History


cancer (breast cancer), CHF (systolic with EF of 30%), hypertension


Additional medical history: diverticulitis.  Graves disease and now 

hypothyroid.  chronic venous stasis with chronic lower extremity wounds.  

obesity





- Surgical History


Reports: cancer surgery (bilateral mastectomy)





- Family History


Additional family history: Son has inguinal growth.  Daughter has "heart 

problems"





- Social History


Smoking Status: Former smoker (stopped smoking in 21 years ago, smoked 1 ppd 

for about 40 years)


Additional social history: has 2 children, lives with her son





Review of Systems


Review of Systems: 





ROS: 10pt was reviewed & negative except for what was stated in HPI & below





Physical Exam


Physical Exam: 

















Temp Pulse Resp BP Pulse Ox


 


 36.7 C   98   20   105/52 L  94 


 


 01/30/19 05:38  01/30/19 06:30  01/30/19 06:30  01/30/19 06:30  01/30/19 06:30




















O2 (L/minute)                  2














Constitutional: obese, uncomfortable


Eyes: PERRL, anicteric sclera


Ears, Nose, Mouth, Throat: moist mucous membranes, no oral mucosal ulcers


Cardiovascular: regular rate and rhythym, systolic murmur


Respiratory: no respiratory distress, clear to auscultation


Gastrointestinal: normoactive bowel sounds, soft, non-tender abdomen


Skin: warm, other (Erythema, fibrinous exudate from R shin)


Neurologic: AAOx3, CN II-XII Intact


Psychiatric: interacting appropriately, not anxious





Lab Data & Imaging Review





 01/30/19 05:30





 01/30/19 05:30














WBC  15.04 10^3/uL (3.80-9.50)  H  01/30/19  05:30    


 


RBC  3.59 10^6/uL (4.18-5.33)  L  01/30/19  05:30    


 


Hgb  10.7 g/dL (12.6-16.3)  L  01/30/19  05:30    


 


Hct  34.0 % (38.0-47.0)  L  01/30/19  05:30    


 


MCV  94.7 fL (81.5-99.8)   01/30/19  05:30    


 


MCH  29.8 pg (27.9-34.1)   01/30/19  05:30    


 


MCHC  31.5 g/dL (32.4-36.7)  L  01/30/19  05:30    


 


RDW  15.8 % (11.5-15.2)  H  01/30/19  05:30    


 


Plt Count  273 10^3/uL (150-400)   01/30/19  05:30    


 


MPV  10.1 fL (8.7-11.7)   01/30/19  05:30    


 


Neut % (Auto)  85.1 % (39.3-74.2)  H  01/30/19  05:30    


 


Lymph % (Auto)  3.0 % (15.0-45.0)  L  01/30/19  05:30    


 


Mono % (Auto)  5.5 % (4.5-13.0)   01/30/19  05:30    


 


Eos % (Auto)  5.2 % (0.6-7.6)   01/30/19  05:30    


 


Baso % (Auto)  0.5 % (0.3-1.7)   01/30/19  05:30    


 


Nucleat RBC Rel Count  0.1 % (0.0-0.2)   01/30/19  05:30    


 


Absolute Neuts (auto)  12.80 10^3/uL (1.70-6.50)  H  01/30/19  05:30    


 


Absolute Lymphs (auto)  0.45 10^3/uL (1.00-3.00)  L  01/30/19  05:30    


 


Absolute Monos (auto)  0.83 10^3/uL (0.30-0.80)  H  01/30/19  05:30    


 


Absolute Eos (auto)  0.78 10^3/uL (0.03-0.40)  H  01/30/19  05:30    


 


Absolute Basos (auto)  0.08 10^3/uL (0.02-0.10)   01/30/19  05:30    


 


Absolute Nucleated RBC  0.02 10^3/uL (0-0.01)  H  01/30/19  05:30    


 


Immature Gran %  0.7 % (0.0-1.1)   01/30/19  05:30    


 


Immature Gran #  0.11 10^3/uL (0.00-0.10)  H  01/30/19  05:30    


 


RBC/WBC/PLT Morphology  TNP   01/30/19  05:30    


 


Platelet Estimate  TNP   01/30/19  05:30    


 


PT  14.8 SEC (12.0-15.0)   01/30/19  05:30    


 


INR  1.14  (0.83-1.16)   01/30/19  05:30    


 


APTT  28.5 SEC (23.0-38.0)   01/30/19  05:30    


 


VBG Lactic Acid  1.2 mmol/L (0.7-2.1)   01/30/19  05:50    


 


Sodium  134 mEq/L (135-145)  L  01/30/19  05:30    


 


Potassium  6.3 mEq/L (3.5-5.2)  H*  01/30/19  05:30    


 


Chloride  107 mEq/L ()   01/30/19  05:30    


 


Carbon Dioxide  21 mEq/l (22-31)  L  01/30/19  05:30    


 


Anion Gap  6 mEq/L (6-14)   01/30/19  05:30    


 


BUN  61 mg/dL (7-23)  H  01/30/19  05:30    


 


Creatinine  1.4 mg/dL (0.6-1.0)  H  01/30/19  05:30    


 


Estimated GFR  36   01/30/19  05:30    


 


Glucose  109 mg/dL ()  H  01/30/19  05:30    


 


Calcium  8.8 mg/dL (8.5-10.4)   01/30/19  05:30    


 


NT-Pro-B Natriuret Pep  760 pg/mL (0-450)  H  01/30/19  05:30    








Visualized and Interpreted Chest x-ray results: Yes


Chest X-Ray results: no infiltrate, other (AICD)


Visualized and Interpreted EKG results: Yes


EKG Interpretation: Positive for: other (V-paced)





Assessment & Plan


Assessment: 








77 yo F w/ hx of NICM s/p AICD, morbid obesity, chronic venous stasis dermatitis

, and CKD presents with generalized weakness and concern for RLE cellulitis.








Plan: 


1. RLE cellulitis - Presents with reported fever from SNF and WBC of 15k. 

Source is most likely RLE cellulitis overlying known venous stasis dermatitis.


- Gentle fluid noting NICM


- Cefazolin 2g IV q8h


- Blood cultures pending


- Wound care consult placed


2. Hypotension - SBP in the 80-90 range on arrival; improved to 100's after 500 

mL NS. This may be due to overdiuresis with contribution from infection. 

Lactate is normal on admission, which is reassuring.


- Gentle IVF PRN


- Treat infection as above


3. NICM - Last EF 33%; s/p AICD.


- Hold diuretics, Coreg, and Entresto initially noting hypotension, restart as 

indicated


- Cardiac diet, monitor I/Os, daily weights


4. CKD - Stage III, serum creatinine at baseline.


- Renally dose medications, avoid nephrotoxic agents


5. Hypothyroid - Continue LTX pending reconciliation


6. TIMI/OHS





Diet - Cardiac


Code - Full per MOST form


Ppx - SQH


Dispo - Admit under observation status

## 2019-01-30 NOTE — PDMN
Medical Necessity


Medical necessity: Pt meets IP criteria as of 1/30/2019 per MD and MCG M-70 (

cellulitis); est los > 2 mn for ongoing tx and management of cellulitis 

superimposed on known venous stasis in the setting of chronic systolic CHF with 

chronic O2 dependent respiratory failure and morbid obesity (BMI 45) as well as 

acute hyperkalemia and hyponatremia; requiring IV ABX, serial labs, medication 

management, cardiology consultation, wound care consultation, and PT/OT.

## 2019-01-31 LAB — PLATELET # BLD: 225 10^3/UL (ref 150–400)

## 2019-01-31 RX ADMIN — ETHACRYNIC ACID SCH MG: 25 TABLET ORAL at 12:17

## 2019-01-31 RX ADMIN — PRAMIPEXOLE DIHYDROCHLORIDE SCH MG: 1 TABLET ORAL at 21:50

## 2019-01-31 RX ADMIN — ETHACRYNIC ACID SCH MG: 25 TABLET ORAL at 21:54

## 2019-01-31 RX ADMIN — ACETAMINOPHEN SCH MG: 500 TABLET ORAL at 09:10

## 2019-01-31 RX ADMIN — ENOXAPARIN SODIUM SCH MG: 100 INJECTION SUBCUTANEOUS at 09:06

## 2019-01-31 RX ADMIN — Medication SCH MLS: at 04:47

## 2019-01-31 RX ADMIN — NYSTATIN SCH APP: 100000 POWDER TOPICAL at 21:49

## 2019-01-31 RX ADMIN — GABAPENTIN SCH MG: 300 CAPSULE ORAL at 09:06

## 2019-01-31 RX ADMIN — NYSTATIN SCH APP: 100000 POWDER TOPICAL at 09:11

## 2019-01-31 RX ADMIN — GABAPENTIN SCH MG: 300 CAPSULE ORAL at 16:00

## 2019-01-31 RX ADMIN — ACETAMINOPHEN SCH MG: 500 TABLET ORAL at 16:00

## 2019-01-31 RX ADMIN — ASPIRIN SCH MG: 325 TABLET, DELAYED RELEASE ORAL at 09:06

## 2019-01-31 RX ADMIN — SODIUM HYPOCHLORITE SCH APP: 1.25 SOLUTION TOPICAL at 21:54

## 2019-01-31 RX ADMIN — SODIUM HYPOCHLORITE SCH APP: 1.25 SOLUTION TOPICAL at 09:12

## 2019-01-31 RX ADMIN — ENOXAPARIN SODIUM SCH MG: 100 INJECTION SUBCUTANEOUS at 21:49

## 2019-01-31 RX ADMIN — SODIUM CHLORIDE SCH MLS: 900 INJECTION, SOLUTION INTRAVENOUS at 14:56

## 2019-01-31 RX ADMIN — HYDROCORTISONE SCH APP: 25 CREAM TOPICAL at 09:12

## 2019-01-31 RX ADMIN — ACETAMINOPHEN SCH MG: 500 TABLET ORAL at 21:50

## 2019-01-31 RX ADMIN — CARVEDILOL SCH MG: 6.25 TABLET, FILM COATED ORAL at 09:06

## 2019-01-31 RX ADMIN — HYDROCORTISONE SCH APP: 25 CREAM TOPICAL at 21:49

## 2019-01-31 RX ADMIN — LEVOTHYROXINE SODIUM SCH MCG: 137 TABLET ORAL at 04:42

## 2019-01-31 RX ADMIN — OXYCODONE HYDROCHLORIDE PRN MG: 15 TABLET ORAL at 14:56

## 2019-01-31 RX ADMIN — GABAPENTIN SCH MG: 300 CAPSULE ORAL at 21:49

## 2019-01-31 RX ADMIN — OXYCODONE HYDROCHLORIDE PRN MG: 15 TABLET ORAL at 21:51

## 2019-01-31 RX ADMIN — CARVEDILOL SCH MG: 6.25 TABLET, FILM COATED ORAL at 18:15

## 2019-01-31 NOTE — SOAPPROG
SOAP Progress Note


Assessment/Plan: 


Assessment:








77 y/o woman with multiple medical issues including: CAD with known  of RCA, 

chronic ischemic systolic CHF with LVEF 33% s/p CRT-D in 12/19, chronic morbid 

obesity, CRI, IDDM and chronic venous insufficiency admitted with near syncope, 

hypotension, fevers and concern for leg cellulitis. AICD checked and normal 

function with no arrhythmias. Echo shows LVEF still 33% with normal RVEF. She 

feels a little more energy and stable BP. PLAN:





1)start Ethacrynic Acid 25mg BID


2)rest of meds without changes. No Entresto or Aldactone yet.


3)AM BMP 





01/31/19 11:13





Subjective: 





overall feels a little stronger. CORDERO at 10ft. Denies CP, near syncope, 

palpitations or PND.


Objective: 





 Vital Signs











Temp Pulse Resp BP Pulse Ox


 


 36.7 C   91   16   114/47 L  99 


 


 01/31/19 08:00  01/31/19 08:00  01/31/19 08:00  01/31/19 08:00  01/31/19 08:00








 Microbiology











 01/30/19 11:25 Gram Stain - Final





 Leg - Swab 








 Laboratory Results





 01/31/19 05:50 





 01/31/19 05:50 





 











 01/30/19 01/31/19 02/01/19





 05:59 05:59 05:59


 


Intake Total  2090 


 


Output Total  950 400


 


Balance  1140 -400








 











PT  14.8 SEC (12.0-15.0)   01/30/19  05:30    


 


INR  1.14  (0.83-1.16)   01/30/19  05:30    














Physical Exam





- Physical Exam


General Appearance: obese


EENT: PERRL/EOMI


Neck: non-tender


Respiratory: lungs clear


Cardiac/Chest: regular rate, rhythm, systolic murmur, No gallop (1/6 UDAY)


Peripheral Pulses: 1+: femoral (R), femoral (L), dorsalis-pedis (R), dorsalis-

pedis (L), 2+: carotid (R), carotid (L)


Abdomen: non-tender, No guarding, No ascites


Skin: warm/dry


Extremities: pedal edema


Neuro/Psych: alert





ICD10 Worksheet


Patient Problems: 


 Problems











Problem Status Onset


 


Cellulitis Acute  


 


Dehydration Acute  


 


CHF (congestive heart failure) Acute  


 


Chronic Disease Mgmt/Transitional Care Acute  


 


Diverticulitis large intestine w/o perforation or abscess w/o bleeding Acute  


 


Shortness of breath Acute

## 2019-01-31 NOTE — ASMTCMCOM
CM Note

 

CM Note                       

Notes:

CM met with pt. at this time PT/OT are recommending SNF. Pt was agreeable for CM to submit a 

referral to [facility]. 

CM to follow. 



Plan: SNF. 

 

Date Signed:  01/31/2019 05:18 PM

Electronically Signed By:CHALO Cevallos

## 2019-01-31 NOTE — HOSPPROG
Hospitalist Progress Note


Assessment/Plan: 





*  RLE cellulitis - superimposed on known venous stasis - purulent/malodorous


   -Non-lactose fermenting GNR on culture - change to IV meropenum


   -consult ID


   -assess for PVD


      -failed attempt at GWEN due to wound


      -check CTA LE with runoff


*  Hyperkalemia - potassium 6.3


   -now improved


   -hold Entresto, Spironolactone and KCl


   -cards consult to reconsider CHF med regimen


*   Rash - suspect drug rash


   -newest med is Demadex


   -now on Ethacrynic acid


*  Hypotension


   -holding meds


*  Chronic systolic CHF - EF 33% - s/p BiV ICD


   -Dr. Mcarthur following


*  CAD - known 100% occluded RCA


   -check lipids - likely needs statin


*  Morbid obesity BMI 45 - with TIMI/OHS


*  Chronic respiratory failure due to above


   -baseline 2L


*  CKD


   -at baseline


*  Hyponatremia


   -fluid restrict














Subjective: No new complaints


Objective: 


 Vital Signs











Temp Pulse Resp BP Pulse Ox


 


 36.7 C   100   15   118/49 L  97 


 


 01/31/19 16:00  01/31/19 16:00  01/31/19 16:00  01/31/19 16:00  01/31/19 16:00








 Microbiology











 01/30/19 11:25 Gram Stain - Final





 Leg - Swab 








 Laboratory Results





 01/31/19 05:50 





 01/31/19 05:50 





 











 01/30/19 01/31/19 02/01/19





 05:59 05:59 05:59


 


Intake Total  2090 


 


Output Total  950 650


 


Balance  1140 -650








 











PT  14.8 SEC (12.0-15.0)   01/30/19  05:30    


 


INR  1.14  (0.83-1.16)   01/30/19  05:30    














- Physical Exam


Constitutional: no apparent distress, appears nourished, not in pain


Cardiovascular: regular rate and rhythym, no murmur, rub, or gallop


Respiratory: no respiratory distress, no rales or rhonchi, clear to auscultation


Gastrointestinal: normoactive bowel sounds, soft, non-tender abdomen, no 

palpable masses


Skin: warm, erythema, other (skin breakdown with oozing purulent material, 

maloderous)


Neurologic: AAOx3, sensation intact bilaterally


Psychiatric: interacting appropriately, not anxious, not encephalopathic, 

thought process linear





ICD10 Worksheet


Patient Problems: 


 Problems











Problem Status Onset


 


Cellulitis Acute  


 


Dehydration Acute  


 


CHF (congestive heart failure) Acute  


 


Chronic Disease Mgmt/Transitional Care Acute  


 


Diverticulitis large intestine w/o perforation or abscess w/o bleeding Acute  


 


Shortness of breath Acute

## 2019-02-01 LAB — CK SERPL-CCNC: 48 IU/L (ref 0–156)

## 2019-02-01 PROCEDURE — 02HV33Z INSERTION OF INFUSION DEVICE INTO SUPERIOR VENA CAVA, PERCUTANEOUS APPROACH: ICD-10-PCS | Performed by: RADIOLOGY

## 2019-02-01 RX ADMIN — OXYCODONE HYDROCHLORIDE PRN MG: 15 TABLET ORAL at 01:47

## 2019-02-01 RX ADMIN — ASPIRIN SCH MG: 325 TABLET, DELAYED RELEASE ORAL at 09:22

## 2019-02-01 RX ADMIN — SODIUM HYPOCHLORITE SCH APP: 1.25 SOLUTION TOPICAL at 22:44

## 2019-02-01 RX ADMIN — ACETAMINOPHEN SCH MG: 500 TABLET ORAL at 06:28

## 2019-02-01 RX ADMIN — GABAPENTIN SCH MG: 300 CAPSULE ORAL at 09:22

## 2019-02-01 RX ADMIN — ACETAMINOPHEN SCH MG: 500 TABLET ORAL at 15:55

## 2019-02-01 RX ADMIN — OXYCODONE HYDROCHLORIDE PRN MG: 15 TABLET ORAL at 12:55

## 2019-02-01 RX ADMIN — ETHACRYNIC ACID SCH: 25 TABLET ORAL at 22:50

## 2019-02-01 RX ADMIN — ATORVASTATIN CALCIUM SCH MG: 40 TABLET, FILM COATED ORAL at 09:22

## 2019-02-01 RX ADMIN — ENOXAPARIN SODIUM SCH MG: 100 INJECTION SUBCUTANEOUS at 09:22

## 2019-02-01 RX ADMIN — DAPTOMYCIN SCH MLS: 500 INJECTION, POWDER, LYOPHILIZED, FOR SOLUTION INTRAVENOUS at 15:54

## 2019-02-01 RX ADMIN — CARVEDILOL SCH MG: 25 TABLET, FILM COATED ORAL at 18:11

## 2019-02-01 RX ADMIN — NYSTATIN SCH APP: 100000 POWDER TOPICAL at 12:05

## 2019-02-01 RX ADMIN — GABAPENTIN SCH MG: 300 CAPSULE ORAL at 15:55

## 2019-02-01 RX ADMIN — SODIUM CHLORIDE SCH MLS: 900 INJECTION, SOLUTION INTRAVENOUS at 01:47

## 2019-02-01 RX ADMIN — SODIUM HYPOCHLORITE SCH APP: 1.25 SOLUTION TOPICAL at 12:05

## 2019-02-01 RX ADMIN — OXYCODONE HYDROCHLORIDE PRN MG: 15 TABLET ORAL at 12:04

## 2019-02-01 RX ADMIN — HYDROCORTISONE SCH APP: 25 CREAM TOPICAL at 09:00

## 2019-02-01 RX ADMIN — GABAPENTIN SCH MG: 300 CAPSULE ORAL at 22:40

## 2019-02-01 RX ADMIN — NYSTATIN SCH APP: 100000 POWDER TOPICAL at 22:42

## 2019-02-01 RX ADMIN — OXYCODONE HYDROCHLORIDE PRN MG: 15 TABLET ORAL at 22:39

## 2019-02-01 RX ADMIN — SODIUM CHLORIDE SCH MLS: 900 INJECTION, SOLUTION INTRAVENOUS at 14:34

## 2019-02-01 RX ADMIN — PRAMIPEXOLE DIHYDROCHLORIDE SCH MG: 1 TABLET ORAL at 22:42

## 2019-02-01 RX ADMIN — LEVOTHYROXINE SODIUM SCH MCG: 137 TABLET ORAL at 06:28

## 2019-02-01 RX ADMIN — ATORVASTATIN CALCIUM SCH: 40 TABLET, FILM COATED ORAL at 15:23

## 2019-02-01 RX ADMIN — HYDROCORTISONE SCH APP: 25 CREAM TOPICAL at 22:43

## 2019-02-01 RX ADMIN — CARVEDILOL SCH MG: 25 TABLET, FILM COATED ORAL at 09:23

## 2019-02-01 RX ADMIN — ETHACRYNIC ACID SCH MG: 25 TABLET ORAL at 09:22

## 2019-02-01 RX ADMIN — ACETAMINOPHEN SCH MG: 500 TABLET ORAL at 22:42

## 2019-02-01 RX ADMIN — ENOXAPARIN SODIUM SCH MG: 100 INJECTION SUBCUTANEOUS at 22:42

## 2019-02-01 NOTE — ASMTCMCOM
CM Note

 

CM Note                       

Notes:

2/1/2019 Case Management Note



Discussed d/c plan with pt, son Antoine [phone] and daughter Becky BRYAN [phone].  Pt was at 

[facility] for approximately 2 hours before admission to [facility].  Pt agreeable to return.  Faxed 

updates to [facility].



Onsite conversation with Chantelle grossman from [facility].  Accepted pt for return.



Discussed with RN.  Anticipating d/c early next week.



Case Management d/c poc:  [facility] rehab.



Case Management to follow.

 

Date Signed:  02/01/2019 02:38 PM

Electronically Signed By:Mari Shin RN

## 2019-02-01 NOTE — WOCRNPDOC
WOCRN Advanced Assessment Note





- Skin Integrity Problem, Advanced Assess





  ** Right Lower Leg


Dressing Type: ABD Pad, Gauze, Kerlix


Dressing Description: Intact, Shadowed


Exudate Amount: Minimal


Exudate Characteristic(s): Serous


Integumentary Issue Intervention: Dressing Changed


Brianna Wound Tissue: Erythema (bright beet red extending from gaiter area to 

metatarsal heads.)


Wound Bed Constitution: Red/Pink - Non Granular Tissue (80%), Adhered Slough (20

%)


Wound Edges: Attached, Irregular


Skin Integrity Problem Comment: Wound is much  than previous assessment 

two days ago and much of the dead skin/loose slough has been debrided with the 

dressing changes. Patient's leg is also much less swollen. The erythema has not 

changed/improved much since the first assessment. Plan to continue 1/4 strength 

dakins moist to dry dressing changes BID over the weekend and will reassess 

Monday. ANN king in room and assissted with care. Patient's daughter in room. 

All questions answered.

## 2019-02-01 NOTE — HOSPPROG
Hospitalist Progress Note


Assessment/Plan: 





#MRSA/Pseudomonas RLE cellulitis - superimposed on known venous stasis 


-IV meropenum, Daptomycin 


-CTA legs without PAD


      


*  Hyperkalemia - now resolved


   -now improved


   -hold Entresto, Spironolactone and KCl


   -cards consult to reconsider CHF med regimen





*  Rash - suspect drug rash


   -newest med is Demadex, now on Ethacrynic acid. PRN hydroxyzine





*  Hypotension: resolved. Resuming BP meds slowly





*  Chronic systolic CHF - EF 33% - s/p BiV ICD


   -Dr. Mcarthur following. Coreg 12.5 BID, re-add Entresto this weekend. Follow 

BMP





*  CAD - known 100% occluded RCA. Hold statin while on Dapto





*  Morbid obesity BMI 45 - with TIMI/OHS





*  Chronic respiratory failure due to above


   -baseline 2L


*  CKD


   -at baseline





*  Hyponatremia: fluid restrict





#Cardiac diet





#DVT ppx: Lovenox





Disp: inpatient admission for IV abx, cardiac med titration


Subjective: itching all over


Objective: 


 Vital Signs











Temp Pulse Resp BP Pulse Ox


 


 36.6 C   85   17   122/51 H  99 


 


 02/01/19 08:00  02/01/19 09:23  02/01/19 08:00  02/01/19 09:23  02/01/19 08:00








 Microbiology











 01/30/19 11:25 Gram Stain - Final





 Leg - Swab 








 Laboratory Results





 01/31/19 05:50 





 01/31/19 05:50 





 











 01/31/19 02/01/19 02/02/19





 05:59 05:59 05:59


 


Intake Total 2090 1390 300


 


Output Total 950 1250 


 


Balance 1140 140 300








 











PT  14.8 SEC (12.0-15.0)   01/30/19  05:30    


 


INR  1.14  (0.83-1.16)   01/30/19  05:30    














- Time Spent With Patient


Time Spent with Patient: greater than 35 minutes


Time Spent with Patient: Greater than 35 minutes spent on this patients care, 

greater than 50% of time spent counseling, educating, and coordinating care 

regarding the above mentioned plan.





- Physical Exam


Constitutional: obese


Eyes: PERRL


Ears, Nose, Mouth, Throat: moist mucous membranes


Cardiovascular: regular rate and rhythym, edema (+2 LE edema)


Respiratory: no respiratory distress


Gastrointestinal: normoactive bowel sounds


Skin: rash (diffuse maculopapular rash over trunk and extremities)


Musculoskeletal: other (cellulitis right leg)


Neurologic: AAOx3, CN II-XII Intact


Psychiatric: interacting appropriately





ICD10 Worksheet


Patient Problems: 


 Problems











Problem Status Onset


 


Cellulitis Acute  


 


Dehydration Acute  


 


CHF (congestive heart failure) Acute  


 


Chronic Disease Mgmt/Transitional Care Acute  


 


Diverticulitis large intestine w/o perforation or abscess w/o bleeding Acute  


 


Shortness of breath Acute

## 2019-02-01 NOTE — SOAPPROG
SOAP Progress Note


Assessment/Plan: 


Assessment:








77 y/o woman with multiple medical issues including: CAD with known  of RCA, 

chronic ischemic systolic CHF with LVEF 33% s/p CRT-D in 12/19, chronic morbid 

obesity, CRI, IDDM and chronic venous insufficiency admitted with near syncope, 

hypotension, fevers and concern for leg cellulitis. AICD checked and normal 

function with no arrhythmias. Echo shows LVEF still 33% with normal RVEF. She 

feels a little more energy and stable BP. PLAN:





1)increase Coreg to 12.5mg PO BID


2)rest of meds without changes.


3)once serum K normalizes and okay renal function, will restart Entresto 24/

26mg PO BID later this weekend.


4)daily BMP.


5)continue IV ABX for cellulitis and ID consult. 





02/01/19 07:56





Subjective: 





feels stronger. Pain and throbbing in legs better but still there. Denies CP, 

near syncope, palpitations or PND. Ambulated around room with assistance.


Objective: 





 Vital Signs











Temp Pulse Resp BP Pulse Ox


 


 36.6 C   90   18   134/59 H  96 


 


 02/01/19 04:00  02/01/19 04:00  02/01/19 04:00  02/01/19 04:00  02/01/19 04:00








 Microbiology











 01/30/19 11:25 Gram Stain - Final





 Leg - Swab 








 Laboratory Results





 01/31/19 05:50 





 01/31/19 05:50 





 











 01/31/19 02/01/19 02/02/19





 05:59 05:59 05:59


 


Intake Total 2090 1390 


 


Output Total 950 1250 


 


Balance 1140 140 








 











PT  14.8 SEC (12.0-15.0)   01/30/19  05:30    


 


INR  1.14  (0.83-1.16)   01/30/19  05:30    














Physical Exam





- Physical Exam


General Appearance: alert, obese


EENT: PERRL/EOMI


Neck: non-tender


Respiratory: chest non-tender, lungs clear


Cardiac/Chest: regular rate, rhythm, systolic murmur, No JVD


Peripheral Pulses: 1+: femoral (R), femoral (L), dorsalis-pedis (R), dorsalis-

pedis (L), 2+: carotid (R), carotid (L)


Abdomen: non-tender, No guarding, No rebound, No ascites


Skin: warm/dry


Extremities: pedal edema


Neuro/Psych: oriented x 3





ICD10 Worksheet


Patient Problems: 


 Problems











Problem Status Onset


 


Cellulitis Acute  


 


Dehydration Acute  


 


CHF (congestive heart failure) Acute  


 


Chronic Disease Mgmt/Transitional Care Acute  


 


Diverticulitis large intestine w/o perforation or abscess w/o bleeding Acute  


 


Shortness of breath Acute

## 2019-02-01 NOTE — PCMIDPN
Assessment/Plan: 


Assessment:


Bilateral lower extremity edema.  Secondary to poorly compensated congestive 

heart failure.  Patient has a right lower extremity however that is intensely 

red and edematous.  This is consistent with acute soft tissue infection.  

Cultures of the wound have shown Pseudomonas, Proteus and MRSA.  Currently the 

patient is on meropenem which is adequate for the Pseudomonas and Proteus 

however does not cover MRSA.  Will add daptomycin IV Q 24 hr but given her 

borderline creatinine clearance and general poor health will dose at 4 

milligrams/kilogram.








Plan:


1. Continue IV meropenem.


2. Start daptomycin 400 mg IV Q 24 hr.


3. Follow appearance of right lower extremity.


4. Will hold her atorvastatin while dosing daptomycin.














02/01/19 13:57





Subjective: 





Patient is resting in her hospital bed.  Daughter is in the room.  She notes 

that her right lower extremity looks about the same as it did when she was 

admitted.  She has no new complaints.  She notes that her rash over her back 

and upper extremities has been present for around 1 month.  It precedes any 

antibiotics.


Objective: 


Meropenem # 2 Vital Signs











Temp Pulse Resp BP Pulse Ox


 


 36.6 C   85   17   122/51 H  99 


 


 02/01/19 08:00  02/01/19 09:23  02/01/19 08:00  02/01/19 09:23  02/01/19 08:00








 Microbiology











 01/30/19 11:25 Gram Stain - Final





 Leg - Swab 








 Laboratory Results





 01/31/19 05:50 





 02/01/19 12:10 





 











 01/31/19 02/01/19 02/02/19





 05:59 05:59 05:59


 


Intake Total 2090 1390 300


 


Output Total 950 1250 


 


Balance 1140 140 300














- Physical Exam


General Appearance: WD/WN, alert, obese, non-toxic


Respiratory: lungs clear, normal breath sounds, No respiratory distress


Cardiac/Chest: regular rate, rhythm, No tachycardia


Extremities: pedal edema, inflammation, erythema (Right lower extremity), No non

-tender, No normal inspection


Skin: normal color, warm/dry, rash (Over back and shoulders and bilateral upper 

extremities.)


Neuro/Psych: alert, normal mood/affect, oriented x 3





ICD10 Worksheet


Patient Problems: 


 Problems











Problem Status Onset


 


Cellulitis Acute  


 


Dehydration Acute  


 


CHF (congestive heart failure) Acute  


 


Chronic Disease Mgmt/Transitional Care Acute  


 


Diverticulitis large intestine w/o perforation or abscess w/o bleeding Acute  


 


Shortness of breath Acute

## 2019-02-02 RX ADMIN — GABAPENTIN SCH MG: 300 CAPSULE ORAL at 15:00

## 2019-02-02 RX ADMIN — DAPTOMYCIN SCH MLS: 500 INJECTION, POWDER, LYOPHILIZED, FOR SOLUTION INTRAVENOUS at 09:46

## 2019-02-02 RX ADMIN — ACETAMINOPHEN SCH: 500 TABLET ORAL at 15:02

## 2019-02-02 RX ADMIN — PRAMIPEXOLE DIHYDROCHLORIDE SCH MG: 1 TABLET ORAL at 20:05

## 2019-02-02 RX ADMIN — ASPIRIN SCH MG: 325 TABLET, DELAYED RELEASE ORAL at 09:49

## 2019-02-02 RX ADMIN — ACETAMINOPHEN SCH MG: 500 TABLET ORAL at 20:04

## 2019-02-02 RX ADMIN — ENOXAPARIN SODIUM SCH MG: 100 INJECTION SUBCUTANEOUS at 09:49

## 2019-02-02 RX ADMIN — HYDROCORTISONE SCH APP: 25 CREAM TOPICAL at 20:03

## 2019-02-02 RX ADMIN — SODIUM HYPOCHLORITE SCH APP: 1.25 SOLUTION TOPICAL at 09:59

## 2019-02-02 RX ADMIN — NYSTATIN SCH APP: 100000 POWDER TOPICAL at 09:59

## 2019-02-02 RX ADMIN — ACETAMINOPHEN SCH MG: 500 TABLET ORAL at 09:48

## 2019-02-02 RX ADMIN — GABAPENTIN SCH MG: 300 CAPSULE ORAL at 22:06

## 2019-02-02 RX ADMIN — SODIUM CHLORIDE SCH MLS: 900 INJECTION, SOLUTION INTRAVENOUS at 13:44

## 2019-02-02 RX ADMIN — CARVEDILOL SCH MG: 25 TABLET, FILM COATED ORAL at 17:56

## 2019-02-02 RX ADMIN — ETHACRYNIC ACID SCH MG: 25 TABLET ORAL at 14:58

## 2019-02-02 RX ADMIN — SODIUM CHLORIDE SCH MLS: 900 INJECTION, SOLUTION INTRAVENOUS at 02:24

## 2019-02-02 RX ADMIN — OXYCODONE HYDROCHLORIDE PRN MG: 15 TABLET ORAL at 20:06

## 2019-02-02 RX ADMIN — GABAPENTIN SCH MG: 300 CAPSULE ORAL at 09:48

## 2019-02-02 RX ADMIN — NYSTATIN SCH APP: 100000 POWDER TOPICAL at 20:03

## 2019-02-02 RX ADMIN — CARVEDILOL SCH MG: 25 TABLET, FILM COATED ORAL at 09:47

## 2019-02-02 RX ADMIN — ETHACRYNIC ACID SCH MG: 25 TABLET ORAL at 09:47

## 2019-02-02 RX ADMIN — ENOXAPARIN SODIUM SCH MG: 100 INJECTION SUBCUTANEOUS at 20:07

## 2019-02-02 RX ADMIN — LEVOTHYROXINE SODIUM SCH MCG: 137 TABLET ORAL at 05:51

## 2019-02-02 RX ADMIN — HYDROCORTISONE SCH APP: 25 CREAM TOPICAL at 09:59

## 2019-02-02 RX ADMIN — SODIUM HYPOCHLORITE SCH APP: 1.25 SOLUTION TOPICAL at 22:09

## 2019-02-02 NOTE — SOAPPROG
SOAP Progress Note


Assessment/Plan: 


Assessment:


SOAP Progress Note


Assessment/Plan: 


Assessment:








77 y/o woman with multiple medical issues including: CAD with known  of RCA, 

chronic ischemic systolic CHF with LVEF 33% s/p CRT-D in 12/19, chronic morbid 

obesity, CRI, IDDM and chronic venous insufficiency admitted with near syncope, 

hypotension, fevers and concern for leg cellulitis. AICD checked and normal 

function with no arrhythmias. Echo shows LVEF still 33% with normal RVEF. She 

feels a little more energy and stable BP. PLAN:





1) continue current cardiac meds


2)rest of meds without changes.


3) if patient has stable potassium and creatinine in on Sunday will restart 

Entresto.


4)daily BMP.


5)continue IV ABX for cellulitis and ID consult. 





02/01/19 07:56





























Plan:  Increase activity as tolerated and allowed by nursing service.





02/02/19 09:45





Subjective: 





Patient up in chair feeling better today no cardiovascular complaints.


Objective: 





 Vital Signs











Temp Pulse Resp BP Pulse Ox


 


 37.2 C   95   12   136/61 H  97 


 


 02/02/19 08:43  02/02/19 08:43  02/02/19 08:43  02/02/19 08:43  02/02/19 08:43








 Microbiology











 01/30/19 11:25 Gram Stain - Final





 Leg - Swab Wound Culture - Final





    Pseudomonas Aeruginosa





    Proteus Mirabilis





    Staphylococcus Aureus





    Aerococcus Viridans








 Laboratory Results





 01/31/19 05:50 





 02/02/19 04:00 





 











 02/01/19 02/02/19 02/03/19





 05:59 05:59 05:59


 


Intake Total 1390 1680 


 


Output Total 1250 600 


 


Balance 140 1080 








 











PT  14.8 SEC (12.0-15.0)   01/30/19  05:30    


 


INR  1.14  (0.83-1.16)   01/30/19  05:30    














Physical Exam





- Physical Exam


Respiratory: lungs clear


Cardiac/Chest: normal peripheral pulses, regular rate, rhythm (Left dorsalis 

pedis pulse was normal right was unable to be felt due to bandaging of her leg.)





ICD10 Worksheet


Patient Problems: 


 Problems











Problem Status Onset


 


Cellulitis Acute  


 


Dehydration Acute  


 


Chronic Disease Mgmt/Transitional Care Acute  


 


Diverticulitis large intestine w/o perforation or abscess w/o bleeding Acute  


 


CHF (congestive heart failure) Acute  


 


Shortness of breath Acute

## 2019-02-02 NOTE — HOSPPROG
Hospitalist Progress Note


Assessment/Plan: 





#MRSA/Pseudomonas RLE cellulitis - superimposed on known venous stasis 


-IV meropenum, Daptomycin 


-CTA legs without PAD


      


#Hyperkalemia - now resolved


   -now improved


   -hold Entresto, Spironolactone and KCl


   -cards consult to reconsider CHF med regimen





# Rash - suspect drug rash from Demadex, now on Ethacrynic acid. PRN 

hydroxyzine. Less itching today





#Hypotension: resolved. Resuming BP meds slowly





#Chronic systolic CHF - EF 33% - s/p BiV ICD


- Coreg 12.5 BID, re-add Entresto this weekend if K and Cr stable





#CAD - known 100% occluded RCA. Hold statin while on Dapto





#Morbid obesity BMI 45 - with TIMI/OHS





#Chronic respiratory failure due to above


   -baseline 2L





*CKD: Cr baseline


   


#Hyponatremia: resolved. fluid restrict





#Cardiac diet





#DVT ppx: Lovenox





Disp: inpatient admission for IV abx, cardiac med titration


Subjective: rash less itchy today


Objective: 


 Vital Signs











Temp Pulse Resp BP Pulse Ox


 


 37.2 C   95   12   136/61 H  97 


 


 02/02/19 08:43  02/02/19 08:43  02/02/19 08:43  02/02/19 08:43  02/02/19 08:43








 Microbiology











 01/30/19 11:25 Gram Stain - Final





 Leg - Swab Wound Culture - Final





    Pseudomonas Aeruginosa





    Proteus Mirabilis





    Staphylococcus Aureus





    Aerococcus Viridans








 Laboratory Results





 01/31/19 05:50 





 02/02/19 04:00 





 











 02/01/19 02/02/19 02/03/19





 05:59 05:59 05:59


 


Intake Total 1390 1680 


 


Output Total 1250 600 


 


Balance 140 1080 








 











PT  14.8 SEC (12.0-15.0)   01/30/19  05:30    


 


INR  1.14  (0.83-1.16)   01/30/19  05:30    














- Time Spent With Patient


Time Spent with Patient: greater than 35 minutes


Time Spent with Patient: Greater than 35 minutes spent on this patients care, 

greater than 50% of time spent counseling, educating, and coordinating care 

regarding the above mentioned plan.





- Physical Exam


Constitutional: no apparent distress, obese


Ears, Nose, Mouth, Throat: moist mucous membranes


Cardiovascular: regular rate and rhythym, edema


Respiratory: no respiratory distress


Gastrointestinal: normoactive bowel sounds


Genitourinary: no bladder fullness


Skin: rash (diffuse maculopapular rash chest, back, arms.  Right leg dressed. 

Left lower leg red, warm to touch)


Musculoskeletal: full muscle strength


Neurologic: AAOx3, CN II-XII Intact


Psychiatric: interacting appropriately





ICD10 Worksheet


Patient Problems: 


 Problems











Problem Status Onset


 


Cellulitis Acute  


 


Dehydration Acute  


 


CHF (congestive heart failure) Acute  


 


Chronic Disease Mgmt/Transitional Care Acute  


 


Diverticulitis large intestine w/o perforation or abscess w/o bleeding Acute  


 


Shortness of breath Acute

## 2019-02-02 NOTE — PCMIDPN
Assessment/Plan: 


Assessment:


Bilateral lower extremity edema.  Secondary to poorly compensated congestive 

heart failure.  Patient has a right lower extremity however that is intensely 

red and edematous in this has not changed much in the last 24 hr.  This is 

consistent with acute soft tissue infection.  Cultures of the wound have shown 

Pseudomonas, Proteus and MRSA as well as Aeromonas.  Currently the patient is 

on meropenem and daptomycin for coverage.








Plan:


1. Continue IV meropenem and daptomycin.


2. Follow appearance of right lower extremity.


3. Will hold her atorvastatin while dosing daptomycin.














02/01/19 13:57





02/02/19 12:15





Subjective: 





Patient is sitting up in a chair in her hospital bed.  She denies any new 

complaint.  States that her wound dressing was changed earlier today.  To her I 

the right lower extremity does not look significantly different.  She denies 

any fevers or chills.  No rash.


Objective: 


Meropenem # 3


Daptomycin # 1 Vital Signs











Temp Pulse Resp BP Pulse Ox


 


 37.6 C   97   19   116/57 L  99 


 


 02/02/19 11:03  02/02/19 11:03  02/02/19 11:03  02/02/19 11:03  02/02/19 11:03








 Microbiology











 01/30/19 11:25 Gram Stain - Final





 Leg - Swab Wound Culture - Final





    Pseudomonas Aeruginosa





    Proteus Mirabilis





    Staphylococcus Aureus





    Aerococcus Viridans








 Laboratory Results





 01/31/19 05:50 





 02/02/19 04:00 





 











 02/01/19 02/02/19 02/03/19





 05:59 05:59 05:59


 


Intake Total 1390 1680 


 


Output Total 1250 600 


 


Balance 140 1080 














- Physical Exam


General Appearance: WD/WN, alert, no apparent distress, non-toxic


Respiratory: lungs clear, normal breath sounds, No respiratory distress


Cardiac/Chest: regular rate, rhythm, No tachycardia


Extremities: pedal edema, No non-tender, No normal inspection (No proximal 

erythema above the dressing on the right lower extremity.)


Skin: normal color, warm/dry, rash (Unchanged in appearance)


Neuro/Psych: alert, normal mood/affect, oriented x 3





ICD10 Worksheet


Patient Problems: 


 Problems











Problem Status Onset


 


Cellulitis Acute  


 


Dehydration Acute  


 


CHF (congestive heart failure) Acute  


 


Chronic Disease [facility]/Transitional Care Acute  


 


Diverticulitis large intestine w/o perforation or abscess w/o bleeding Acute  


 


Shortness of breath Acute

## 2019-02-03 RX ADMIN — CETIRIZINE HYDROCHLORIDE SCH MG: 10 TABLET, FILM COATED ORAL at 14:57

## 2019-02-03 RX ADMIN — ACETAMINOPHEN SCH MG: 500 TABLET ORAL at 20:50

## 2019-02-03 RX ADMIN — SACUBITRIL AND VALSARTAN SCH EA: 24; 26 TABLET, FILM COATED ORAL at 09:57

## 2019-02-03 RX ADMIN — HYDROCORTISONE SCH APP: 25 CREAM TOPICAL at 09:56

## 2019-02-03 RX ADMIN — PRAMIPEXOLE DIHYDROCHLORIDE SCH MG: 1 TABLET ORAL at 20:51

## 2019-02-03 RX ADMIN — SODIUM HYPOCHLORITE SCH APP: 1.25 SOLUTION TOPICAL at 21:06

## 2019-02-03 RX ADMIN — ENOXAPARIN SODIUM SCH MG: 100 INJECTION SUBCUTANEOUS at 20:51

## 2019-02-03 RX ADMIN — CARVEDILOL SCH MG: 25 TABLET, FILM COATED ORAL at 09:17

## 2019-02-03 RX ADMIN — ASPIRIN SCH MG: 325 TABLET, DELAYED RELEASE ORAL at 09:17

## 2019-02-03 RX ADMIN — SODIUM CHLORIDE SCH MLS: 900 INJECTION, SOLUTION INTRAVENOUS at 14:45

## 2019-02-03 RX ADMIN — OXYCODONE HYDROCHLORIDE PRN MG: 15 TABLET ORAL at 13:22

## 2019-02-03 RX ADMIN — OXYCODONE HYDROCHLORIDE PRN MG: 15 TABLET ORAL at 21:28

## 2019-02-03 RX ADMIN — ACETAMINOPHEN SCH MG: 500 TABLET ORAL at 09:16

## 2019-02-03 RX ADMIN — ETHACRYNIC ACID SCH MG: 25 TABLET ORAL at 14:46

## 2019-02-03 RX ADMIN — ENOXAPARIN SODIUM SCH MG: 100 INJECTION SUBCUTANEOUS at 09:16

## 2019-02-03 RX ADMIN — SODIUM HYPOCHLORITE SCH APP: 1.25 SOLUTION TOPICAL at 09:58

## 2019-02-03 RX ADMIN — GABAPENTIN SCH MG: 300 CAPSULE ORAL at 09:16

## 2019-02-03 RX ADMIN — HYDROCORTISONE SCH APP: 25 CREAM TOPICAL at 20:54

## 2019-02-03 RX ADMIN — NYSTATIN SCH APP: 100000 POWDER TOPICAL at 09:57

## 2019-02-03 RX ADMIN — NYSTATIN SCH APP: 100000 POWDER TOPICAL at 21:06

## 2019-02-03 RX ADMIN — SODIUM CHLORIDE SCH MLS: 900 INJECTION, SOLUTION INTRAVENOUS at 02:34

## 2019-02-03 RX ADMIN — GABAPENTIN SCH MG: 300 CAPSULE ORAL at 20:51

## 2019-02-03 RX ADMIN — ACETAMINOPHEN SCH MG: 500 TABLET ORAL at 15:00

## 2019-02-03 RX ADMIN — GABAPENTIN SCH MG: 300 CAPSULE ORAL at 15:00

## 2019-02-03 RX ADMIN — CARVEDILOL SCH MG: 25 TABLET, FILM COATED ORAL at 17:08

## 2019-02-03 RX ADMIN — LEVOTHYROXINE SODIUM SCH MCG: 137 TABLET ORAL at 05:24

## 2019-02-03 RX ADMIN — ETHACRYNIC ACID SCH MG: 25 TABLET ORAL at 09:18

## 2019-02-03 RX ADMIN — DAPTOMYCIN SCH MLS: 500 INJECTION, POWDER, LYOPHILIZED, FOR SOLUTION INTRAVENOUS at 09:57

## 2019-02-03 RX ADMIN — SACUBITRIL AND VALSARTAN SCH EA: 24; 26 TABLET, FILM COATED ORAL at 20:51

## 2019-02-03 NOTE — SOAPPROG
SOAP Progress Note


Assessment/Plan: 


Assessment:


SOAP Progress Note


Assessment/Plan: 


Assessment:








77 y/o woman with multiple medical issues including: CAD with known  of RCA, 

chronic ischemic systolic CHF with LVEF 33% s/p CRT-D in 12/19, chronic morbid 

obesity, CRI, IDDM and chronic venous insufficiency admitted with near syncope, 

hypotension, fevers and concern for leg cellulitis. AICD checked and normal 

function with no arrhythmias. Echo shows LVEF still 33% with normal RVEF. She 

feels a little more energy and stable BP.


.1. restart entresto


4)daily BMP.


5)continue IV ABX for cellulitis and ID consult. 





02/01/19 07:56





























Plan:  restart entresto





02/02/19 09:45





02/03/19 09:06





Subjective: 





pt doing well from a cv standpoint...labs at baseline...will restart entresto 

today


Objective: 





 Vital Signs











Temp Pulse Resp BP Pulse Ox


 


 37.3 C   92   18   145/64 H  97 


 


 02/03/19 08:00  02/03/19 08:00  02/03/19 08:00  02/03/19 08:00  02/03/19 08:00








 Microbiology











 01/30/19 11:25 Gram Stain - Final





 Leg - Swab Wound Culture - Final





    Pseudomonas Aeruginosa





    Proteus Mirabilis





    Staphylococcus Aureus





    Aerococcus Viridans








 Laboratory Results





 01/31/19 05:50 





 02/03/19 03:50 





 











 02/02/19 02/03/19 02/04/19





 05:59 05:59 05:59


 


Intake Total 1680 1370 


 


Output Total 600 1200 


 


Balance 1080 170 








 











PT  14.8 SEC (12.0-15.0)   01/30/19  05:30    


 


INR  1.14  (0.83-1.16)   01/30/19  05:30    














Physical Exam





- Physical Exam


Cardiac/Chest: regular rate, rhythm, No edema





ICD10 Worksheet


Patient Problems: 


 Problems











Problem Status Onset


 


Cellulitis Acute  


 


Dehydration Acute  


 


CHF (congestive heart failure) Acute  


 


Chronic Disease Mgmt/Transitional Care Acute  


 


Diverticulitis large intestine w/o perforation or abscess w/o bleeding Acute  


 


Shortness of breath Acute

## 2019-02-03 NOTE — HOSPPROG
Hospitalist Progress Note


Assessment/Plan: 





#MRSA/Pseudomonas RLE cellulitis - superimposed on known venous stasis 


-IV meropenum, Daptomycin; monitor response


-CTA legs without PAD


      


#Hyperkalemia - now resolved





# Rash- from Demadex, now on Ethacrynic acid. PRN hydroxyzine. Less itching 

today


-Zyrtec





#Hypotension: resolved. Resuming BP meds slowly





#Chronic systolic CHF - EF 33% - s/p BiV ICD


- Coreg 12.5 BID, re-add Entresto today





#CAD -known 100% occluded RCA. Hold statin while on Dapto





#Morbid obesity BMI 45 - with TIMI/OHS





#Chronic respiratory failure due to above. Baseline 2L





*CKD: Cr at baseline


   


#Hyponatremia: resolved. fluid restrict





#Cardiac diet





#DVT ppx: Lovenox





Disp: inpatient admission for IV abx, cardiac med titration. Discussed with Dr. Wang.  Will DC to SNF when clinically improved


Subjective: itching less today. No chest pain or SOB


Objective: 


 Vital Signs











Temp Pulse Resp BP Pulse Ox


 


 37.3 C   92   18   145/64 H  97 


 


 02/03/19 08:00  02/03/19 08:00  02/03/19 08:00  02/03/19 08:00  02/03/19 08:00








 Microbiology











 01/30/19 11:25 Gram Stain - Final





 Leg - Swab Wound Culture - Final





    Pseudomonas Aeruginosa





    Proteus Mirabilis





    Staphylococcus Aureus





    Aerococcus Viridans








 Laboratory Results





 01/31/19 05:50 





 02/03/19 03:50 





 











 02/02/19 02/03/19 02/04/19





 05:59 05:59 05:59


 


Intake Total 1680 1370 


 


Output Total 600 1200 


 


Balance 1080 170 








 











PT  14.8 SEC (12.0-15.0)   01/30/19  05:30    


 


INR  1.14  (0.83-1.16)   01/30/19  05:30    














- Time Spent With Patient


Time Spent with Patient: greater than 35 minutes


Time Spent with Patient: Greater than 35 minutes spent on this patients care, 

greater than 50% of time spent counseling, educating, and coordinating care 

regarding the above mentioned plan.





- Physical Exam


Constitutional: no apparent distress, obese


Eyes: PERRL


Ears, Nose, Mouth, Throat: moist mucous membranes


Cardiovascular: regular rate and rhythym


Respiratory: no respiratory distress


Gastrointestinal: normoactive bowel sounds


Genitourinary: No grant in urethra


Skin: rash (maculpapular rash over posterior legs, chest, arms, back)


Musculoskeletal: full muscle strength, other (right UE PICC line)


Neurologic: AAOx3, CN II-XII Intact


Psychiatric: interacting appropriately





ICD10 Worksheet


Patient Problems: 


 Problems











Problem Status Onset


 


Cellulitis Acute  


 


Dehydration Acute  


 


CHF (congestive heart failure) Acute  


 


Chronic Disease Mgmt/Transitional Care Acute  


 


Diverticulitis large intestine w/o perforation or abscess w/o bleeding Acute  


 


Shortness of breath Acute

## 2019-02-04 RX ADMIN — NYSTATIN SCH APP: 100000 POWDER TOPICAL at 09:31

## 2019-02-04 RX ADMIN — ACETAMINOPHEN SCH MG: 500 TABLET ORAL at 07:23

## 2019-02-04 RX ADMIN — ATORVASTATIN CALCIUM SCH: 40 TABLET, FILM COATED ORAL at 12:56

## 2019-02-04 RX ADMIN — ACETAMINOPHEN SCH MG: 500 TABLET ORAL at 15:29

## 2019-02-04 RX ADMIN — CETIRIZINE HYDROCHLORIDE SCH MG: 10 TABLET, FILM COATED ORAL at 09:30

## 2019-02-04 RX ADMIN — LEVOTHYROXINE SODIUM SCH MCG: 137 TABLET ORAL at 07:23

## 2019-02-04 RX ADMIN — ETHACRYNIC ACID SCH MG: 25 TABLET ORAL at 09:30

## 2019-02-04 RX ADMIN — SODIUM HYPOCHLORITE SCH APP: 1.25 SOLUTION TOPICAL at 09:33

## 2019-02-04 RX ADMIN — SODIUM HYPOCHLORITE SCH: 1.25 SOLUTION TOPICAL at 22:08

## 2019-02-04 RX ADMIN — ACETAMINOPHEN SCH MG: 500 TABLET ORAL at 21:43

## 2019-02-04 RX ADMIN — HYDROCORTISONE SCH APP: 25 CREAM TOPICAL at 21:54

## 2019-02-04 RX ADMIN — GABAPENTIN SCH MG: 300 CAPSULE ORAL at 15:29

## 2019-02-04 RX ADMIN — HYDROCORTISONE SCH APP: 25 CREAM TOPICAL at 09:32

## 2019-02-04 RX ADMIN — ENOXAPARIN SODIUM SCH MG: 100 INJECTION SUBCUTANEOUS at 21:45

## 2019-02-04 RX ADMIN — ATORVASTATIN CALCIUM SCH: 40 TABLET, FILM COATED ORAL at 13:23

## 2019-02-04 RX ADMIN — SODIUM CHLORIDE SCH MLS: 900 INJECTION, SOLUTION INTRAVENOUS at 14:44

## 2019-02-04 RX ADMIN — ETHACRYNIC ACID SCH MG: 25 TABLET ORAL at 18:20

## 2019-02-04 RX ADMIN — OXYCODONE HYDROCHLORIDE PRN MG: 15 TABLET ORAL at 10:42

## 2019-02-04 RX ADMIN — CARVEDILOL SCH MG: 25 TABLET, FILM COATED ORAL at 07:24

## 2019-02-04 RX ADMIN — SACUBITRIL AND VALSARTAN SCH EA: 24; 26 TABLET, FILM COATED ORAL at 10:39

## 2019-02-04 RX ADMIN — PRAMIPEXOLE DIHYDROCHLORIDE SCH MG: 1 TABLET ORAL at 21:43

## 2019-02-04 RX ADMIN — ENOXAPARIN SODIUM SCH MG: 100 INJECTION SUBCUTANEOUS at 09:31

## 2019-02-04 RX ADMIN — OXYCODONE HYDROCHLORIDE PRN MG: 15 TABLET ORAL at 21:44

## 2019-02-04 RX ADMIN — GABAPENTIN SCH MG: 300 CAPSULE ORAL at 09:31

## 2019-02-04 RX ADMIN — GABAPENTIN SCH MG: 300 CAPSULE ORAL at 21:43

## 2019-02-04 RX ADMIN — SACUBITRIL AND VALSARTAN SCH EA: 24; 26 TABLET, FILM COATED ORAL at 21:45

## 2019-02-04 RX ADMIN — SODIUM CHLORIDE SCH MLS: 900 INJECTION, SOLUTION INTRAVENOUS at 01:18

## 2019-02-04 RX ADMIN — NYSTATIN SCH APP: 100000 POWDER TOPICAL at 21:46

## 2019-02-04 RX ADMIN — DAPTOMYCIN SCH MLS: 500 INJECTION, POWDER, LYOPHILIZED, FOR SOLUTION INTRAVENOUS at 09:31

## 2019-02-04 RX ADMIN — ASPIRIN SCH MG: 325 TABLET, DELAYED RELEASE ORAL at 09:30

## 2019-02-04 RX ADMIN — CARVEDILOL SCH MG: 25 TABLET, FILM COATED ORAL at 18:20

## 2019-02-04 NOTE — ASMTCMCOM
CM Note

 

CM Note                       

Notes:

Per ID, patient does not need Daptomycin any more and only a few more days of Meropenem. Her 

discharge to [facility] was conditional on the above, so I alerted the facility. Anticipate 

undremarkable d/c to [facility] in 2-3 days. Case Management will follow. 

 

Date Signed:  02/04/2019 12:26 PM

Electronically Signed By:Tita Muñoz RN

## 2019-02-04 NOTE — SOAPPROG
SOAP Progress Note


Assessment/Plan: 


Assessment:








77 y/o woman with multiple medical issues including: CAD with known  of RCA, 

chronic ischemic systolic CHF with LVEF 33% s/p CRT-D in 12/19, chronic morbid 

obesity, CRI, IDDM and chronic venous insufficiency admitted with near syncope, 

hypotension, fevers and concern for leg cellulitis. AICD checked and normal 

function with no arrhythmias. Echo shows LVEF still 33% with normal RVEF. She 

feels a little more energy and stable BP. PLAN:





1)increase ethacrynic acid to 50mg PO BID


2)rest of meds without changes. For now no aldactone


3)okay from CHF standpoint to transfer to SNF or rehab center when rest of 

medical issues ready.





02/04/19 12:31





Subjective: 





CORDERO at 20-30ft. Feet less painful and weepy. Denies CP, near syncope or 

palpitations.


Objective: 





 Vital Signs











Temp Pulse Resp BP Pulse Ox


 


 36.8 C   78   18   139/59 H  97 


 


 02/04/19 12:00  02/04/19 12:00  02/04/19 12:00  02/04/19 12:00  02/04/19 12:00








 Microbiology











 01/30/19 11:25 Gram Stain - Final





 Leg - Swab Wound Culture - Final





    Pseudomonas Aeruginosa





    Proteus Mirabilis





    Staphylococcus Aureus





    Aerococcus Viridans








 Laboratory Results





 01/31/19 05:50 





 02/04/19 05:10 





 











 02/03/19 02/04/19 02/05/19





 05:59 05:59 05:59


 


Intake Total 1370 1190 


 


Output Total 1200 500 


 


Balance 170 690 








 











PT  14.8 SEC (12.0-15.0)   01/30/19  05:30    


 


INR  1.14  (0.83-1.16)   01/30/19  05:30    














Physical Exam





- Physical Exam


General Appearance: alert, obese


EENT: PERRL/EOMI


Neck: non-tender


Respiratory: lungs clear


Cardiac/Chest: regular rate, rhythm, systolic murmur, No gallop, No JVD


Peripheral Pulses: 1+: femoral (R), femoral (L), dorsalis-pedis (R), dorsalis-

pedis (L), 2+: carotid (R), carotid (L)


Abdomen: non-tender, No guarding, No rebound, No ascites


Skin: warm/dry


Extremities: pedal edema


Neuro/Psych: alert





ICD10 Worksheet


Patient Problems: 


 Problems











Problem Status Onset


 


Cellulitis Acute  


 


Dehydration Acute  


 


CHF (congestive heart failure) Acute  


 


Chronic Disease Mgmt/Transitional Care Acute  


 


Diverticulitis large intestine w/o perforation or abscess w/o bleeding Acute  


 


Shortness of breath Acute

## 2019-02-04 NOTE — HOSPPROG
Hospitalist Progress Note


Assessment/Plan: 





#MSSA/Pseudomonas RLE cellulitis - superimposed on known venous stasis. 

Improving


-not MRSA, Dapto stopped. Continue IV meropenum


-CTA legs without PAD


      


#Hyperkalemia - now resolved





# Rash- from Demadex, now on Ethacrynic acid. PRN hydroxyzine. Less itching 

today


-Zyrtec





#Hypotension: resolved. Resuming BP meds slowly





#Chronic systolic CHF - EF 33% - s/p BiV ICD


- Coreg 12.5 BID, Enestro. Stable from Cardiology standpoint





#CAD -known 100% occluded RCA. Statin





#Morbid obesity BMI 45 - with TIMI/OHS





#Chronic respiratory failure due to above. Baseline 2L





*CKD: Cr at baseline


   


#Hyponatremia: resolved. fluid restrict





#Cardiac diet





#DVT ppx: Lovenox





Disp: inpatient admission for IV abx, cardiac med titration. Discussed with Dr. Wang.  Will DC to SNF when clinically improved


Subjective: pain in leg with dressing change


Objective: 


 Vital Signs











Temp Pulse Resp BP Pulse Ox


 


 36.8 C   78   18   139/59 H  97 


 


 02/04/19 12:00  02/04/19 12:00  02/04/19 12:00  02/04/19 12:00  02/04/19 12:00








 Microbiology











 01/30/19 11:25 Gram Stain - Final





 Leg - Swab Wound Culture - Final





    Pseudomonas Aeruginosa





    Proteus Mirabilis





    Staphylococcus Aureus





    Aerococcus Viridans








 Laboratory Results





 01/31/19 05:50 





 02/04/19 05:10 





 











 02/03/19 02/04/19 02/05/19





 05:59 05:59 05:59


 


Intake Total 1370 1190 


 


Output Total 1200 500 


 


Balance 170 690 








 











PT  14.8 SEC (12.0-15.0)   01/30/19  05:30    


 


INR  1.14  (0.83-1.16)   01/30/19  05:30    














- Time Spent With Patient


Time Spent with Patient: greater than 35 minutes


Time Spent with Patient: Greater than 35 minutes spent on this patients care, 

greater than 50% of time spent counseling, educating, and coordinating care 

regarding the above mentioned plan.





- Physical Exam


Constitutional: obese


Eyes: PERRL


Ears, Nose, Mouth, Throat: moist mucous membranes


Cardiovascular: regular rate and rhythym, edema (RLE >LLE)


Gastrointestinal: normoactive bowel sounds


Genitourinary: no bladder fullness


Skin: warm, rash (erythema improved left leg. Right leg ulcers with clean base, 

no purulence. Diffuse maculopapular rash over trunk/back/extremities slowly 

improving)


Musculoskeletal: full muscle strength


Neurologic: AAOx3, CN II-XII Intact


Psychiatric: interacting appropriately





ICD10 Worksheet


Patient Problems: 


 Problems











Problem Status Onset


 


Cellulitis Acute  


 


Dehydration Acute  


 


CHF (congestive heart failure) Acute  


 


Chronic Disease Mgmt/Transitional Care Acute  


 


Diverticulitis large intestine w/o perforation or abscess w/o bleeding Acute  


 


Shortness of breath Acute

## 2019-02-04 NOTE — PCMIDPN
Assessment/Plan: 


Assessment/Plan:


* Right lower extremity cellulitis likely superimposed on venous insufficiency 

in the setting of congestive heart failure:  Erythema over right lower 

extremity which is faint in color.  Ulcerations are clean based and shallow 

without purulent drainage.  Cultures likely represent colonization with 

multiple pathogens.  Reviewed with lab as formal susceptibility of 

Staphylococcus aureus shows MSSA; currently felt initial identification is MRSA 

in error.  Will therefore discontinue daptomycin in continue therapy with 

meropenem which is providing activity against other isolated pathogens 

including Pseudomonas.  Anticipate short duration of therapy of 5-7 days.  

Continue with ongoing wound care.


* Rash:  Likely drug related and felt to be due to Demadex prior to admission.





02/04/19 10:19





02/04/19 10:24





Subjective: 





Patient complains of persistent pain in right lower extremity.  Rash over upper 

extremities preceded use of antibiotic therapy.


Objective: 


 Vital Signs











Temp Pulse Resp BP Pulse Ox


 


 36.9 C   87   18   154/73 H  96 


 


 02/04/19 07:29  02/04/19 07:29  02/04/19 07:29  02/04/19 07:29  02/04/19 07:29








 Microbiology











 01/30/19 11:25 Gram Stain - Final





 Leg - Swab Wound Culture - Final





    Pseudomonas Aeruginosa





    Proteus Mirabilis





    Staphylococcus Aureus





    Aerococcus Viridans








 Laboratory Results





 01/31/19 05:50 





 02/04/19 05:10 





 











 02/03/19 02/04/19 02/05/19





 05:59 05:59 05:59


 


Intake Total 1370 1190 


 


Output Total 1200 500 


 


Balance 170 690 








Meropenem # 4


Daptomycin # 2


Wound culture with growth of Pseudomonas, Proteus, MSSA, and Aerococcus; 

previously identified MRSA reviewed with lab and not felt to be consistent with 

actual presence of MRSA.





- Physical Exam


General Appearance: alert, no apparent distress, obese


EENT: No scleral icterus, No thrush


Respiratory: lungs clear, No respiratory distress


Cardiac/Chest: regular rate, rhythm


Extremities: inflammation (Right lower extremity with multiple shallow based 

ulcers over anterior shin and posterior calf; no purulent drainage; erythema at 

above ulcer margins extending below knee with mild warmth and tenderness)


Skin: rash (Upper extremities with erythematous scaly rash bilaterally)





ICD10 Worksheet


Patient Problems: 


 Problems











Problem Status Onset


 


Cellulitis Acute  


 


Dehydration Acute  


 


CHF (congestive heart failure) Acute  


 


Chronic Disease Mgmt/Transitional Care Acute  


 


Diverticulitis large intestine w/o perforation or abscess w/o bleeding Acute  


 


Shortness of breath Acute

## 2019-02-05 RX ADMIN — Medication SCH MLS: at 22:39

## 2019-02-05 RX ADMIN — ASPIRIN SCH MG: 325 TABLET, DELAYED RELEASE ORAL at 08:14

## 2019-02-05 RX ADMIN — NYSTATIN SCH APP: 100000 POWDER TOPICAL at 08:19

## 2019-02-05 RX ADMIN — CETIRIZINE HYDROCHLORIDE SCH MG: 10 TABLET, FILM COATED ORAL at 08:13

## 2019-02-05 RX ADMIN — ACETAMINOPHEN SCH MG: 500 TABLET ORAL at 15:56

## 2019-02-05 RX ADMIN — SODIUM CHLORIDE SCH MLS: 900 INJECTION, SOLUTION INTRAVENOUS at 02:21

## 2019-02-05 RX ADMIN — GABAPENTIN SCH MG: 300 CAPSULE ORAL at 21:45

## 2019-02-05 RX ADMIN — ATORVASTATIN CALCIUM SCH MG: 40 TABLET, FILM COATED ORAL at 08:11

## 2019-02-05 RX ADMIN — GABAPENTIN SCH MG: 300 CAPSULE ORAL at 15:56

## 2019-02-05 RX ADMIN — SACUBITRIL AND VALSARTAN SCH EA: 24; 26 TABLET, FILM COATED ORAL at 08:14

## 2019-02-05 RX ADMIN — SACUBITRIL AND VALSARTAN SCH EA: 24; 26 TABLET, FILM COATED ORAL at 21:46

## 2019-02-05 RX ADMIN — ETHACRYNIC ACID SCH MG: 25 TABLET ORAL at 17:48

## 2019-02-05 RX ADMIN — ACETAMINOPHEN SCH MG: 500 TABLET ORAL at 21:45

## 2019-02-05 RX ADMIN — CARVEDILOL SCH MG: 25 TABLET, FILM COATED ORAL at 17:47

## 2019-02-05 RX ADMIN — PRAMIPEXOLE DIHYDROCHLORIDE SCH MG: 1 TABLET ORAL at 21:45

## 2019-02-05 RX ADMIN — NYSTATIN SCH APP: 100000 POWDER TOPICAL at 21:50

## 2019-02-05 RX ADMIN — ENOXAPARIN SODIUM SCH MG: 100 INJECTION SUBCUTANEOUS at 08:14

## 2019-02-05 RX ADMIN — LEVOTHYROXINE SODIUM SCH MCG: 137 TABLET ORAL at 05:59

## 2019-02-05 RX ADMIN — CARVEDILOL SCH MG: 25 TABLET, FILM COATED ORAL at 08:11

## 2019-02-05 RX ADMIN — Medication SCH MLS: at 13:57

## 2019-02-05 RX ADMIN — OXYCODONE HYDROCHLORIDE PRN MG: 15 TABLET ORAL at 09:38

## 2019-02-05 RX ADMIN — ACETAMINOPHEN SCH: 500 TABLET ORAL at 08:13

## 2019-02-05 RX ADMIN — HYDROCORTISONE SCH APP: 25 CREAM TOPICAL at 21:51

## 2019-02-05 RX ADMIN — GABAPENTIN SCH MG: 300 CAPSULE ORAL at 08:11

## 2019-02-05 RX ADMIN — HYDROCORTISONE SCH APP: 25 CREAM TOPICAL at 08:19

## 2019-02-05 RX ADMIN — ETHACRYNIC ACID SCH MG: 25 TABLET ORAL at 08:12

## 2019-02-05 RX ADMIN — ENOXAPARIN SODIUM SCH MG: 100 INJECTION SUBCUTANEOUS at 21:46

## 2019-02-05 NOTE — HOSPPROG
Hospitalist Progress Note


Assessment/Plan: 





#MSSA/Pseudomonas RLE cellulitis - superimposed on known venous stasis. 

Improving


-Narrow to Ancef 2/5/19. Wound care, elevate legs


-CTA legs without PAD


      


#Hyperkalemia - now resolved





#Rash: much improved. From Demadex. PRN hydroxyzine. Less itching today


-Zyrtec





#Hypotension: resolved. Resuming BP meds slowly





#Chronic systolic CHF - EF 33% - s/p BiV ICD


- Coreg 12.5 BID, Enestro. Stable from Cardiology standpoint





#CAD -known 100% occluded RCA. Statin





#Morbid obesity BMI 45 - with TMII/OHS





#Chronic respiratory failure due to above. Baseline 2L





*CKD: Cr at baseline


   


#Hyponatremia: resolved. fluid restrict





#Cardiac diet





#DVT ppx: Lovenox





Disp: inpatient admission for IV abx, cardiac med titration. DC to [facility] 

with wound care when clinically stable


Subjective: less pain RLE with dressing changes


Objective: 


 Vital Signs











Temp Pulse Resp BP Pulse Ox


 


 36.6 C   81   20   111/59 L  96 


 


 02/05/19 10:38  02/05/19 10:38  02/05/19 10:38  02/05/19 10:38  02/05/19 10:38








 Microbiology











 01/30/19 11:25 Gram Stain - Final





 Leg - Swab Wound Culture - Final





    Pseudomonas Aeruginosa





    Proteus Mirabilis





    Staphylococcus Aureus





    Aerococcus Viridans








 Laboratory Results





 01/31/19 05:50 





 02/05/19 03:45 





 











 02/04/19 02/05/19 02/06/19





 05:59 05:59 05:59


 


Intake Total 1190 1150 


 


Output Total 500  


 


Balance 690 1150 








 











PT  14.8 SEC (12.0-15.0)   01/30/19  05:30    


 


INR  1.14  (0.83-1.16)   01/30/19  05:30    














- Time Spent With Patient


Time Spent with Patient: greater than 35 minutes


Time Spent with Patient: Greater than 35 minutes spent on this patients care, 

greater than 50% of time spent counseling, educating, and coordinating care 

regarding the above mentioned plan.





- Physical Exam


Constitutional: obese


Eyes: PERRL


Ears, Nose, Mouth, Throat: moist mucous membranes


Cardiovascular: regular rate and rhythym, edema (RLE>LLE)


Respiratory: no respiratory distress


Gastrointestinal: normoactive bowel sounds


Genitourinary: no bladder fullness


Skin: other (RLE dressed with Kerlex, CDI. LLE erythema improved. Diffuse 

maculopapular rash improving daily)


Musculoskeletal: full muscle strength


Neurologic: AAOx3


Psychiatric: interacting appropriately





ICD10 Worksheet


Patient Problems: 


 Problems











Problem Status Onset


 


Cellulitis Acute  


 


Dehydration Acute  


 


CHF (congestive heart failure) Acute  


 


Chronic Disease Mgmt/Transitional Care Acute  


 


Diverticulitis large intestine w/o perforation or abscess w/o bleeding Acute  


 


Shortness of breath Acute

## 2019-02-05 NOTE — ASMTCMCOM
ESCOBAR Note

 

ESCOBAR Note                       

Notes:

Pts case discussed in tx rounds. Pt had concerns about [facility] abilities to address her 

wounds. However, she would like to return to [facility]. She wants to make sure they have the proper 


wound care supplies and instructions. Wound Care is not in today. Sukhwinder pts nurse will call the 

outpatient wound care for direction. ESCOBAR spoke to Dr. Stiles about this matter as well. Dr. Stiles 

is changing her ivabx to cefazolin. Pt will most likely be here for a couple more days. ESCOBAR spoke to 


Brianna at [facility] and she will speak to Sukhwinder about the wound care concerns.



CM spoke w/ pts son Chris on the phone about d/c POC. CM to follow.



Plan: [facility]

 

Date Signed:  02/05/2019 12:31 PM

Electronically Signed By:CHALO Anderson

## 2019-02-05 NOTE — PCMIDPN
Assessment/Plan: 





#RLE cellulitis : wound cx of venous insufficiency ulcer showed MSSA, PsA, 

Proteus, Aerococcus.  Suspect primary pathogen MSSA and others just colonizers. 

CrCl 50-60.  CHF also contributor to SURESH


--narrow to ancef 2gm IV q8h, based on CrCl and weight


--continue LE elevation and wound care


--patient feels wound care at [facility] not as good, could consider weekly 

Wound healing center visits until stabilized, also concerned that [facility] 

will not have needed wound supplies





#Diffuse MP rash w/o mucosal involvement: attributed to diuretic





meds


meropenem 1gm IV q12 #5








Microbiology


01/30/19 11:25   Leg - Swab   Wound Culture - Final


                              Pseudomonas Aeruginosa


                              Proteus Mirabilis


                              Staphylococcus Aureus


                              Aerococcus Viridans


01/30/19 05:50   Blood  Cx (2) Neg





Subjective: 





feels like legs are better


no oral pain


no diarrhea


Objective: 


 Vital Signs











Temp Pulse Resp BP Pulse Ox


 


 36.9 C   90   20   153/75 H  94 


 


 02/05/19 07:42  02/05/19 07:42  02/05/19 07:42  02/05/19 07:42  02/05/19 07:42








 Microbiology











 01/30/19 11:25 Gram Stain - Final





 Leg - Swab Wound Culture - Final





    Pseudomonas Aeruginosa





    Proteus Mirabilis





    Staphylococcus Aureus





    Aerococcus Viridans








 Laboratory Results





 01/31/19 05:50 





 02/05/19 03:45 





 











 02/04/19 02/05/19 02/06/19





 05:59 05:59 05:59


 


Intake Total 1190 1150 


 


Output Total 500  


 


Balance 690 1150 














- Physical Exam


General Appearance: alert, no apparent distress, obese


EENT: dry mucous membranes, other (no oral ulcerations), No thrush


Respiratory: other (decrease bs throughout), No accessory muscle use


Cardiac/Chest: regular rate, rhythm, No systolic murmur


Extremities: pedal edema, other (R LE denuded with open venous ulcerations, 

mild erythema on R still present with sloughing skin.  mild tenderness, no 

creptitis)


Peripheral Pulses: 1+: dorsalis-pedis (R), dorsalis-pedis (L)


Skin: rash (MP eruption - appears to be fadeing arms, chest, back.  Mostly 

spares LE and face)


Neuro/Psych: alert, normal mood/affect, oriented x 3





- Line/s


  ** RUE PICC


Lines: No drainage, No erythema





- Time Spent With Patient


Time Spent with Patient: greater than 35 minutes (care coordinated with RN at 

bedside & pharmacy)


Time Spent with Patient: Greater than 35 minutes spent on this patients care, 

greater than 50% of time spent counseling, educating, and coordinating care 

regarding the above mentioned plan.





ICD10 Worksheet


Patient Problems: 


 Problems











Problem Status Onset


 


Cellulitis Acute  


 


Dehydration Acute  


 


CHF (congestive heart failure) Acute  


 


Chronic Disease Mgmt/Transitional Care Acute  


 


Diverticulitis large intestine w/o perforation or abscess w/o bleeding Acute  


 


Shortness of breath Acute

## 2019-02-05 NOTE — PDCARPN
Cardiology Progress Note


Chief Complaint: 





Legs weeping due to cellulitis


Assessment/Plan: 


Assessment:


79 y/o woman with CAD with known  of RCA, chronic ischemic systolic CHF with 

LVEF 33% s/p CRT-D in 12/19, morbid obesity, CRI, IDDM and venous 

insufficiency. 


Pre- syncope on admission, hypotension, fevers and concern for leg cellulitis. 


AICD interrogated--normal function with no arrhythmias. 


Echo shows LVEF still 33% w/normal LVEF.


Today she is feeling better.  Able to get up and ambulate w/o significant SOB.  

She still feels "winded" at times.  


Legs improving.  Right leg wrap in place.  Continues on ABx.  





Plan:


02/05/19 14:36


Continue on ethacrynic acid to 50mg PO BID


Entresto 24mg/26 mg BID


Coreg 12.5 mg 


Dr Mcarthur will see tomorrow. 


Subjective: 





I feel better each day.


Reviewed/Discussed With: hospitalist, multidisciplinary team


Time Spent with Patient: greater than 25 minutes


Time Spent with Patient: Greater than 25 minutes spent on this patients care, 

greater than 50% of time spent counseling, educating, and coordinating care 

regarding the above mentioned plan.


Objective: 





 Vital Signs (8 Hrs)











  Temp Pulse Resp BP Pulse Ox


 


 02/05/19 10:38  36.6 C  81  20  111/59 L  96


 


 02/05/19 07:42  36.9 C  90  20  153/75 H  94








 Intake/Output (24 Hrs)











 02/04/19 02/05/19 02/06/19





 05:59 05:59 05:59


 


Intake Total 1190 1150 


 


Output Total 500  


 


Balance 690 1150 


 


Intake:   


 


  Oral (ml) 990 1150 


 


  IV Infused (ml) 200  


 


    DAPTOmycin 400 mg In Ns 100  





    100 ml @ 200 mls/hr IV   





    DAILY PRETTY Rx#:[id number]   


 


    Meropenem 1 gm In Ns 100 100  





    ml @ 120 mls/hr IV Q12H   





    PRETTY Rx#:[id number]   


 


Output:   


 


  Urine (ml) 500  


 


    Toilet 500  


 


Other:   


 


  Weight 110.1 kg 109.7 kg 


 


  Number of Voids   


 


    Toilet 6 2 


 


  Number of Stools   


 


    Toilet 6 1 











Result Diagrams: 


 01/31/19 05:50





 02/05/19 03:45





- Physical Exam


Constitutional: no apparent distress


Cardiovascular: regular rate and rhythm, no rubs, no gallops, systolic murmur


Respiratory: clear to auscultate bilat, no crackles, no wheezes


Skin: warm


Neurologic: AAOx3


Psychiatric: cooperative, interactive





ICD10 Worksheet


Patient Problems: 


 Problems











Problem Status Onset


 


Cellulitis Acute  


 


Dehydration Acute  


 


Chronic Disease Mgmt/Transitional Care Acute  


 


Diverticulitis large intestine w/o perforation or abscess w/o bleeding Acute  


 


CHF (congestive heart failure) Acute  


 


Shortness of breath Acute

## 2019-02-06 LAB — PLATELET # BLD: 235 10^3/UL (ref 150–400)

## 2019-02-06 RX ADMIN — HYDROCORTISONE SCH APP: 25 CREAM TOPICAL at 20:49

## 2019-02-06 RX ADMIN — GABAPENTIN SCH MG: 300 CAPSULE ORAL at 08:57

## 2019-02-06 RX ADMIN — GABAPENTIN SCH MG: 300 CAPSULE ORAL at 22:40

## 2019-02-06 RX ADMIN — SACUBITRIL AND VALSARTAN SCH EA: 24; 26 TABLET, FILM COATED ORAL at 20:49

## 2019-02-06 RX ADMIN — SACUBITRIL AND VALSARTAN SCH EA: 24; 26 TABLET, FILM COATED ORAL at 08:59

## 2019-02-06 RX ADMIN — ETHACRYNIC ACID SCH MG: 25 TABLET ORAL at 17:28

## 2019-02-06 RX ADMIN — ACETAMINOPHEN SCH MG: 500 TABLET ORAL at 08:58

## 2019-02-06 RX ADMIN — ENOXAPARIN SODIUM SCH MG: 100 INJECTION SUBCUTANEOUS at 20:48

## 2019-02-06 RX ADMIN — Medication SCH MLS: at 22:40

## 2019-02-06 RX ADMIN — NYSTATIN SCH: 100000 POWDER TOPICAL at 09:00

## 2019-02-06 RX ADMIN — ACETAMINOPHEN SCH MG: 500 TABLET ORAL at 15:14

## 2019-02-06 RX ADMIN — OXYCODONE HYDROCHLORIDE PRN MG: 15 TABLET ORAL at 22:49

## 2019-02-06 RX ADMIN — CETIRIZINE HYDROCHLORIDE SCH MG: 10 TABLET, FILM COATED ORAL at 08:58

## 2019-02-06 RX ADMIN — ASPIRIN SCH MG: 325 TABLET, DELAYED RELEASE ORAL at 08:58

## 2019-02-06 RX ADMIN — PRAMIPEXOLE DIHYDROCHLORIDE SCH MG: 1 TABLET ORAL at 20:49

## 2019-02-06 RX ADMIN — CARVEDILOL SCH MG: 25 TABLET, FILM COATED ORAL at 08:59

## 2019-02-06 RX ADMIN — ENOXAPARIN SODIUM SCH MG: 100 INJECTION SUBCUTANEOUS at 08:59

## 2019-02-06 RX ADMIN — ACETAMINOPHEN SCH: 500 TABLET ORAL at 22:40

## 2019-02-06 RX ADMIN — GABAPENTIN SCH MG: 300 CAPSULE ORAL at 15:14

## 2019-02-06 RX ADMIN — OXYCODONE HYDROCHLORIDE PRN MG: 15 TABLET ORAL at 12:49

## 2019-02-06 RX ADMIN — Medication SCH MLS: at 14:01

## 2019-02-06 RX ADMIN — ATORVASTATIN CALCIUM SCH MG: 40 TABLET, FILM COATED ORAL at 08:58

## 2019-02-06 RX ADMIN — HYDROCORTISONE SCH APP: 25 CREAM TOPICAL at 08:59

## 2019-02-06 RX ADMIN — OXYCODONE HYDROCHLORIDE PRN MG: 15 TABLET ORAL at 00:35

## 2019-02-06 RX ADMIN — LEVOTHYROXINE SODIUM SCH MCG: 137 TABLET ORAL at 05:51

## 2019-02-06 RX ADMIN — ETHACRYNIC ACID SCH MG: 25 TABLET ORAL at 08:58

## 2019-02-06 RX ADMIN — Medication SCH MLS: at 05:51

## 2019-02-06 RX ADMIN — NYSTATIN SCH APP: 100000 POWDER TOPICAL at 20:49

## 2019-02-06 RX ADMIN — CARVEDILOL SCH MG: 25 TABLET, FILM COATED ORAL at 17:28

## 2019-02-06 NOTE — WOCRNPDOC
WOCRN Advanced Assessment Note





- Skin Integrity Problem, Advanced Assess


  ** Left Lower Leg


Dressing Type: Open to Air


Skin Integrity Problem Comment: Leg has significantly improved since patient 

was first admitted approximately one week ago. Erythema and 

lipodermatoscerlosis is present, but most of the large amounts of dead skin has 

been removed and the skin has been moisturized. Patient has a negative stemmers 

but wound RN queries whether there is some component of lymphedema present. 

Patient has good arterial flow to lower legs so compression will be initiated. 

Education done with patient and her daughter on compression and LEVD. Patient's 

daughter was nervous about d/c to rehab and wanted to ensure that very detailed 

wound care orders were written so the staff there could follow them in the 

interim until patient goes to [facility] for follow up.





  ** Right Lower Leg


Dressing Type: ABD Pad, Hydrofera Blue Ready (x3), Kerlix


Dressing Description: Intact, Shadowed


Exudate Amount: Moderate


Exudate Characteristic(s): Serosanguinous


Integumentary Issue Intervention: Dressing Removed


Divya Wound Tissue: Erythema, Macerated, Lipodermatosclerosis, Venous Dermatitis

, Shiny, Painful/Tender


Wound Bed Color: Red


Wound Bed Constitution: Granulation Tissue (100%)


Wound Edges: Attached, Irregular


Site Measurement - Head-to-Toe Length X Width X Depth (cm): 10x27.5x0.2


Skin Integrity Problem Comment: Dressing removed with copious amounts of NS. 

Explained to patient that it is alright if dressings remove some tissue when 

they come off so mechanical debridement occurs. Islands of epithelial cells are 

developing throughout wound bed. They was shown to both the patient and her 

daughter to evette their fears about dressing changes causing damage to the 

wound bed. Wound bed is almost 40% smaller than last week and is healing very 

well. Measured bilateral calf circumference: right is 43, left is 35.5. 

Education re: compression and application of compression. Will supply two sizes

, Tubigrip E for moderate compression and Tubigrip F for right leg (light 

compression until swelling reduces more in the patient's right leg). Foaming 

cleanser applied to right foot and leg (divya wound skin) and more dead skin was 

removed and area was cleaned. Dimethicone cream applied after cleaning. All 

questions answered. Stacy JUAREZ was in room for all care and assisted with 

patient. Wound care will follow. Patient encouraged to call outpatient [facility] asap 

to schedule a follow up appt for application of compression wraps and wound 

care follow up. All questions answered. Shared images of wound with Dr. Richards 

from ID via [employer].

## 2019-02-06 NOTE — HOSPPROG
Hospitalist Progress Note


Assessment/Plan: 


#MSSA/Pseudomonas RLE cellulitis - superimposed on known venous stasis. 

Improving


- Narrowed to Ancef 2/5/19. Per ID last day for abx is today, s/p 7 day course 


- Wound care, elevate legs


- CTA legs without PAD


- R > L edema this AM, LE U/S ordered which was negative for DVT





#Hyperkalemia - now resolved





#Rash: much improved. From Demadex. PRN hydroxyzine. Less itching today


- Continue Zyrtec





#Hypotension: resolved. Resuming BP meds slowly





#Chronic systolic CHF - EF 33% - s/p BiV ICD


- Coreg 12.5 BID, Enestro. Stable from Cardiology standpoint





#CAD -known 100% occluded RCA. Statin





#Morbid obesity BMI 45 - with TIMI/OHS





#Chronic respiratory failure due to above. Baseline 2L





*CKD: Cr at baseline


   


#Hyponatremia: resolved. fluid restrict





#Cardiac diet





#DVT ppx: Lovenox





Disp: inpatient admission for IV abx, cardiac med titration. DC to [facility] 

with wound care when clinically stable, likely tomorrow 





Subjective: Patient reports R >L leg swelling this AM


Objective: 


 Vital Signs











Temp Pulse Resp BP Pulse Ox


 


 36.6 C   86   17   132/68 H  97 


 


 02/06/19 15:10  02/06/19 15:10  02/06/19 15:10  02/06/19 15:10  02/06/19 15:10








 Microbiology











 01/30/19 11:25 Gram Stain - Final





 Leg - Swab Wound Culture - Final





    Pseudomonas Aeruginosa





    Proteus Mirabilis





    Staphylococcus Aureus





    Aerococcus Viridans








 Laboratory Results





 02/06/19 03:55 





 02/06/19 03:55 





 











 02/05/19 02/06/19 02/07/19





 05:59 05:59 05:59


 


Intake Total 1150 450 550


 


Balance 1150 450 550








 











PT  14.8 SEC (12.0-15.0)   01/30/19  05:30    


 


INR  1.14  (0.83-1.16)   01/30/19  05:30    














- Physical Exam


Constitutional: chronically ill appearing


Eyes: PERRL


Ears, Nose, Mouth, Throat: moist mucous membranes


Cardiovascular: regular rate and rhythym


Respiratory: no respiratory distress


Gastrointestinal: soft, non-tender abdomen


Skin: warm


Neurologic: AAOx3


Psychiatric: interacting appropriately





ICD10 Worksheet


Patient Problems: 


 Problems











Problem Status Onset


 


Cellulitis Acute  


 


Dehydration Acute  


 


CHF (congestive heart failure) Acute  


 


Chronic Disease Mgmt/Transitional Care Acute  


 


Diverticulitis large intestine w/o perforation or abscess w/o bleeding Acute  


 


Shortness of breath Acute

## 2019-02-06 NOTE — SOAPPROG
SOAP Progress Note


Assessment/Plan: 


Assessment:








79 y/o woman with multiple medical issues including: CAD with known  of RCA, 

chronic ischemic systolic CHF with LVEF 33% s/p CRT-D in 12/19, chronic morbid 

obesity, CRI, IDDM and chronic venous insufficiency admitted with near syncope, 

hypotension, fevers and concern for leg cellulitis. AICD checked and normal 

function with no arrhythmias. Echo shows LVEF still 33% with normal RVEF. She 

feels a little more energy and stable BP. PLAN:





1)no change in current meds. Would not restart aldactone currently with serum K

+ today 4.8 already and history of hyperkalemia on Aldactone in past.


2)agree with right leg u/s to rule out DVT


3)okay to transfer to SNF from CHF standpoint when rest of medical issues 

stable enough.





02/06/19 14:24





Subjective: 





CORDERO at 10-20 ft. Denies CP, palpitations or syncope. Right leg is more swollen 

than left. Overall leg pain less.


Objective: 





 Vital Signs











Temp Pulse Resp BP Pulse Ox


 


 36.7 C   70   18   128/82 H  97 


 


 02/06/19 12:10  02/06/19 12:10  02/06/19 12:10  02/06/19 12:10  02/06/19 12:10








 Microbiology











 01/30/19 11:25 Gram Stain - Final





 Leg - Swab Wound Culture - Final





    Pseudomonas Aeruginosa





    Proteus Mirabilis





    Staphylococcus Aureus





    Aerococcus Viridans








 Laboratory Results





 02/06/19 03:55 





 02/06/19 03:55 





 











 02/05/19 02/06/19 02/07/19





 05:59 05:59 05:59


 


Intake Total 1150 450 


 


Balance 1150 450 








 











PT  14.8 SEC (12.0-15.0)   01/30/19  05:30    


 


INR  1.14  (0.83-1.16)   01/30/19  05:30    














Physical Exam





- Physical Exam


General Appearance: alert, obese


EENT: PERRL/EOMI


Neck: non-tender


Respiratory: lungs clear


Cardiac/Chest: regular rate, rhythm, systolic murmur, No gallop, No JVD


Peripheral Pulses: 1+: femoral (R), femoral (L), dorsalis-pedis (R), dorsalis-

pedis (L), 2+: carotid (R), carotid (L)


Abdomen: non-tender, No guarding, No rebound, No ascites


Skin: warm/dry


Extremities: pedal edema


Neuro/Psych: alert





ICD10 Worksheet


Patient Problems: 


 Problems











Problem Status Onset


 


Cellulitis Acute  


 


Dehydration Acute  


 


CHF (congestive heart failure) Acute  


 


Chronic Disease Mgmt/Transitional Care Acute  


 


Diverticulitis large intestine w/o perforation or abscess w/o bleeding Acute  


 


Shortness of breath Acute

## 2019-02-06 NOTE — PCMIDPN
Assessment/Plan: 


Assessment:  78-year-old woman with bilateral chronic venous stasis dermatitis 

with right lower extremity ulcerations.  She will complete a total of 7 days of 

IV antibiotics after today's doses with no ongoing evidence for an active 

infection.  Remains difficult to determine if new onset of redness and any 

point resistance infection or the natural history of venous stasis changes.  

She was require ongoing wound care as an outpatient and assistance with 

management of her edema.





1. Right lower extremity cellulitis, improved


2. Chronic bilateral lower extremity venous stasis dermatitis


3. Morbid obesity contributing to 2.


4. Chronic systolic heart failure, compensated; contributing to 2. 





Plan:


1. Continue cefazolin 2 g q.8h; stop after today's doses, order placed


2. Outpatient wound care follow-up 





Vern Richards MD


Infectious Diseases





02/06/19 10:20





Subjective: 


No fever or chills. Denies nausea, occasional loose stools. Appetite improving. 

Ambulating without difficulty. No new concerns today. 





Objective: 


 Vital Signs











Temp Pulse Resp BP Pulse Ox


 


 37.0 C   88   14   143/85 H  94 


 


 02/06/19 08:30  02/06/19 08:30  02/06/19 08:30  02/06/19 08:30  02/06/19 08:30








 Microbiology











 01/30/19 11:25 Gram Stain - Final





 Leg - Swab Wound Culture - Final





    Pseudomonas Aeruginosa





    Proteus Mirabilis





    Staphylococcus Aureus





    Aerococcus Viridans








 Laboratory Results





 02/06/19 03:55 





 02/06/19 03:55 





 











 02/05/19 02/06/19 02/07/19





 05:59 05:59 05:59


 


Intake Total 1150 450 


 


Balance 1150 450 








Microbiology





01/30/19 11:25   Leg - Swab   Gram Stain - Final


01/30/19 11:25   Leg - Swab   Wound Culture - Final


                              Pseudomonas Aeruginosa


                              Proteus Mirabilis


                              Staphylococcus Aureus


                              Aerococcus Viridans


01/30/19 06:05   Blood   Blood Culture - Final


01/30/19 05:50   Blood   Blood Culture - Final





 Laboratory Tests











  01/31/19 02/05/19 02/06/19





  05:50 03:45 03:55


 


WBC  8.88   8.79


 


Hgb  9.9 L   10.5 L


 


Plt Count  225   235


 


Creatinine   0.8 














  02/06/19





  03:55


 


WBC 


 


Hgb 


 


Plt Count 


 


Creatinine  0.8














- Physical Exam


General Appearance: alert, no apparent distress, non-toxic


EENT: No scleral icterus


Respiratory: lungs clear, normal breath sounds, No respiratory distress, No 

crackles, No wheezing


Neck: full range of motion, supple


Cardiac/Chest: regular rate, rhythm, No bradycardia, No tachycardia, No 

diastolic murmur, No systolic murmur


Extremities: other (Left lower extremity with circumferential erythema in 

chronic venous stasis dermatitis; right lower extremity dressing not taken down 

problem)


Abdomen: normal bowel sounds (He is), non-tender, soft, No distended (This)


Skin: rash (Blanching erythema over the back diffusely, right upper extremity 

with blanching erythematous macular rash)


Neuro/Psych: alert, normal mood/affect, oriented x 3, No confused





- Time Spent With Patient


Time Spent with Patient: greater than 25 minutes


Time Spent with Patient: Greater than 25 minutes spent on this patients care, 

greater than 50% of time spent counseling, educating, and coordinating care 

regarding the above mentioned plan.





ICD10 Worksheet


Patient Problems: 


 Problems











Problem Status Onset


 


Cellulitis Acute  


 


Dehydration Acute  


 


CHF (congestive heart failure) Acute  


 


Chronic Disease Mgmt/Transitional Care Acute  


 


Diverticulitis large intestine w/o perforation or abscess w/o bleeding Acute  


 


Shortness of breath Acute

## 2019-02-07 VITALS — DIASTOLIC BLOOD PRESSURE: 77 MMHG | SYSTOLIC BLOOD PRESSURE: 146 MMHG

## 2019-02-07 RX ADMIN — ATORVASTATIN CALCIUM SCH MG: 40 TABLET, FILM COATED ORAL at 09:05

## 2019-02-07 RX ADMIN — CETIRIZINE HYDROCHLORIDE SCH MG: 10 TABLET, FILM COATED ORAL at 09:05

## 2019-02-07 RX ADMIN — ASPIRIN SCH MG: 325 TABLET, DELAYED RELEASE ORAL at 09:06

## 2019-02-07 RX ADMIN — CARVEDILOL SCH MG: 25 TABLET, FILM COATED ORAL at 09:04

## 2019-02-07 RX ADMIN — LEVOTHYROXINE SODIUM SCH MCG: 137 TABLET ORAL at 06:26

## 2019-02-07 RX ADMIN — ETHACRYNIC ACID SCH MG: 25 TABLET ORAL at 09:04

## 2019-02-07 RX ADMIN — HYDROCORTISONE SCH APP: 25 CREAM TOPICAL at 09:06

## 2019-02-07 RX ADMIN — SACUBITRIL AND VALSARTAN SCH EA: 24; 26 TABLET, FILM COATED ORAL at 09:04

## 2019-02-07 RX ADMIN — ACETAMINOPHEN SCH MG: 500 TABLET ORAL at 09:05

## 2019-02-07 RX ADMIN — ENOXAPARIN SODIUM SCH MG: 100 INJECTION SUBCUTANEOUS at 09:06

## 2019-02-07 RX ADMIN — GABAPENTIN SCH MG: 300 CAPSULE ORAL at 09:05

## 2019-02-07 NOTE — ASDISCHSUM
----------------------------------------------

Discharge Information

----------------------------------------------

Plan Status:SNF                                      Medically Cleared to Leave:02/06/2019

Discharge Date:02/06/2019                            CM D/C Disposition:Skilled Nursing Facility

ADT D/C Disposition:                                 Projected Discharge Date:02/02/2019 11:00 AM

Transportation at D/C:Wheelchair Van                 Discharge Delay Reason:

Follow-Up Date:02/02/2019 11:00 AM                   Discharge Slot:

Final Diagnosis:

----------------------------------------------

Placement Information

----------------------------------------------

Referral Type:*Nursing Home/SNF                      Referral ID:[id number]

Provider Name:[facility]

Address 1:[address]                    Phone [phone]

Address 2:                                           Fax [phone]

City:[place]                                      Selection Factors:

State:[place]

 

----------------------------------------------

Patient Contact Information

----------------------------------------------

Contact Name:FRANCY                          Relationship:Daughter

Address:[address] [facility]                            Home [phone]

                                                     Work [phone]

City:[place]                                      Alternate Phone:

State/Zip Code:[place] [address]                              Email:

----------------------------------------------

Financial Information

----------------------------------------------

Financial Class:Medicare

Primary Plan Desc:MEDICARE INPATIENT                 Primary Plan Number:[id number]

Secondary Plan Desc:[employer]/MDR SUPPLEMENT              Secondary Plan Number:[id number]

 

 

----------------------------------------------

Assessment Information

----------------------------------------------

----------------------------------------------

LACE

----------------------------------------------

LACE

 

Length of stay for            Answers:  7-13 days                             

current admission                                                             

Acuity / Level of             Answers:  Yes                                   

Care: Did the patient                                                         

have an inpatient                                                             

admission?                                                                    

Comorbidities - select        Answers:  Any tumor (including                  

all that apply                          lymphoma or leukemia)                 

                                        Congestive heart failure              

                                        Other                         Notes:  HTN; Hypothyroid

# of Emergency department     Answers:  1-2                                   

visits in the last 6                                                          

months                                                                        

Score: 14

 

Date Signed:  02/07/2019 11:33 AM

Electronically Signed By:Mari Shin RN

 

 

----------------------------------------------

[facility] CM Progress Note

----------------------------------------------

CM Note

 

CM Note                       

Notes:

Chart reviewed for discharge planning purposes. 78 year old female admitted with hypotension s/p 

fall at her home at [facility]. She was admitted through the ED with concerns of sepsis and 


leg wound. Therapy evaluations pending. CM to follow for needs.



Plan: TBD

 

Date Signed:  01/30/2019 10:49 AM

Electronically Signed By:Sobeida Brambila RN

 

 

----------------------------------------------

[facility] CM Progress Note

----------------------------------------------

CM Note

 

CM Note                       

Notes:

CM met with pt. at this time PT/OT are recommending SNF. Pt was agreeable for CM to submit a 

referral to [facility]. 

CM to follow. 



Plan: SNF. 

 

Date Signed:  01/31/2019 05:18 PM

Electronically Signed By:CHALO Cevallos

 

 

----------------------------------------------

[facility] CM Progress Note

----------------------------------------------

CM Note

 

CM Note                       

Notes:

2/1/2019 Case Management Note



Discussed d/c plan with pt, son Antoine [phone] and daughter Becky BRYAN [phone].  Pt was at 

[facility] for approximately 2 hours before admission to [facility].  Pt agreeable to return.  Faxed 

updates to [facility].



Onsite conversation with Chantelle grossman from [facility].  Accepted pt for return.



Discussed with RN.  Anticipating d/c early next week.



Case Management d/c poc:  [facility] rehab.



Case Management to follow.

 

Date Signed:  02/01/2019 02:38 PM

Electronically Signed By:Mari Shin RN

 

 

----------------------------------------------

[facility] Progress Note

----------------------------------------------

CM Note

 

CM Note                       

Notes:

Per ID, patient does not need Daptomycin any more and only a few more days of Meropenem. Her 

discharge to [facility] was conditional on the above, so I alerted the facility. Anticipate 

undremarkable d/c to [facility] in 2-3 days. Case Management will follow. 

 

Date Signed:  02/04/2019 12:26 PM

Electronically Signed By:Tita Muñoz RN

 

 

----------------------------------------------

[facility] Progress Note

----------------------------------------------

CM Note

 

CM Note                       

Notes:

Pts case discussed in tx rounds. Pt had concerns about [facility] abilities to address her 

wounds. However, she would like to return to [facility]. She wants to make sure they have the proper 


wound care supplies and instructions. Wound Care is not in today. bhaskar Pretty nurse will call the 

outpatient wound care for direction. ESCOBAR spoke to Dr. Stiles about this matter as well. Dr. Stiles 

is changing her ivabx to cefazolin. Pt will most likely be here for a couple more days. ESCOBAR spoke to 


Brianna at [facility] and she will speak to Sukhwinder about the wound care concerns.



ESCOBAR spoke w/ pts son Chris on the phone about d/c POC. CM to follow.



Plan: [facility]

 

Date Signed:  02/05/2019 12:31 PM

Electronically Signed By:CHALO Anderson

 

 

----------------------------------------------

Case Management Discharge Plan Note

----------------------------------------------

Case Management Discharge

 

Discharge Order Complete?     Answers:  Yes                                   

Patient to Obtain             Answers:  Other                         Notes:  [facility]

Medications                                                                   

Transportation Arranged       Answers:  Other                         Notes:  w/c transport arranged 
by 

                                                                              [facility]

Transport will Pick (Date     02/07/2019 11:45 AM

& Time)                       

Faxed Final Orders            Answers:  Yes                           Notes:  to [facility]

Agency/Facility Transfer      Answers:  Yes                           Notes:  tp [facility]

Report Printed & Faxed to                                                     

Receiving Agency                                                              

Family Notified               Answers:  Yes                           Notes:  called daughter Becky choi
n 

                                                                              the phone

Discharge Comments            

Notes:

2/7/2019 Case Management Note



Faxed final orders including detailed wound care instructions to [facility].  Notified Becky 

on the phone.  Confirmed for Becky that pain medication was faxed to [facility] as well.  [facility] 

arranged transport w/c with O2.  RN called report.



Case Management d/c poc:  [facility].

 

Date Signed:  02/07/2019 11:45 AM

Electronically Signed By:Mari Shin RN

 

 

----------------------------------------------

Intervention Information

----------------------------------------------

## 2019-02-07 NOTE — ASMTDCNOTE
Case Management Discharge

 

Discharge Order Complete?     Answers:  Yes                                   

Patient to Obtain             Answers:  Other                         Notes:  [facility] rehab

Medications                                                                   

Transportation Arranged       Answers:  Other                         Notes:  w/c transport arranged 
by 

                                                                              [facility]

Transport will Pick (Date     02/07/2019 11:45 AM

& Time)                       

Faxed Final Orders            Answers:  Yes                           Notes:  to [facility]

Agency/Facility Transfer      Answers:  Yes                           Notes:  tp [facility]

Report Printed & Faxed to                                                     

Receiving Agency                                                              

Family Notified               Answers:  Yes                           Notes:  called daughter Becky o
n 

                                                                              the phone

Discharge Comments            

Notes:

2/7/2019 Case Management Note



Faxed final orders including detailed wound care instructions to [facility].  Notified Becky 

on the phone.  Confirmed for Becky that pain medication was faxed to [facility] as well.  [facility] 

arranged transport w/c with O2.  RN called report.



[facility]  [facility]

 

Date Signed:  02/07/2019 11:45 AM

Electronically Signed By:Mari Shin RN

## 2019-02-07 NOTE — ASMTLACE
LACE

 

Length of stay for            Answers:  7-13 days                             

current admission                                                             

Acuity / Level of             Answers:  Yes                                   

Care: Did the patient                                                         

have an inpatient                                                             

admission?                                                                    

Comorbidities - select        Answers:  Any tumor (including                  

all that apply                          lymphoma or leukemia)                 

                                        Congestive heart failure              

                                        Other                         Notes:  HTN; Hypothyroid

# of Emergency department     Answers:  1-2                                   

visits in the last 6                                                          

months                                                                        

Score: 14

 

Date Signed:  02/07/2019 11:33 AM

Electronically Signed By:Mari Shin RN

## 2019-02-07 NOTE — PDDCSUM
Discharge Summary


Discharge Summary: 


Date of Admission: 1/30/2019 


 Date of Discharge: 1/7/2019





Consults: Cardiology


Procedures: TTE, CTA, CXR, US LLE 


Followup: PCP, Cardiology, Wound Clinic 





#MSSA/Pseudomonas RLE cellulitis - superimposed on known venous stasis. 


- Narrowed to Ancef 2/5/19. s/p 7 day course per ID


- Wound care, elevate legs


- CTA legs without PAD


- R > L edema on 1/6, LE U/S ordered which was negative for DVT





#Hyperkalemia - now resolved, discontinuing Spiranolactone per Cardiology 





#Rash: much improved. From Demadex. PRN hydroxyzine. Less itching today


- Continue Zyrtec qd





#Hypotension: resolved. Resumed home bP meds





#Chronic systolic CHF - EF 33% - s/p BiV ICD


- Coreg 12.5 BID, Enestro. Stable for discharge from Cardiology standpoint





#CAD -known 100% occluded RCA. Statin





#Morbid obesity BMI 45 - with TIMI/OHS





#Chronic respiratory failure due to above. Baseline 2L





*CKD: Cr at baseline


   


#Hyponatremia: resolved. fluid restrict





#Cardiac diet





Time spent on discharge was >35 minutes with >50% of time spent on patient 

education and counseling.

## 2019-02-07 NOTE — PDIAF
- Diagnosis


Diagnosis: RLE Cellulitis 


Code Status: Full Code





- Medication Management


Discharge Medications: electronically signed and located in the Home Medication 

List.





- Orders


Services needed: Home Care, Registered Nurse, Physical Therapy, Occupational 

Therapy


Home Care Face to Face: I certify that this patient was under my care and that 

I had the required face-to-face encounter meeting the encounter requirements on 

the discharge day.  My findings support the fact that the patient is homebound 

as defined in


Home Care Face to Face Continued: CMS Chapter 7 Medicare Benefits Manual 30.1.1

, The condition of the patient is such that there exists a normal inability to 

leave home and consequently, leaving home would require a considerable and 

taxing effort.


Isolation Type: None


Additional Instructions: 





Please follow up within 3- 4 weeks of discharge with outpatient [facility]ound [facility] 

Center so they may continue to manage your venous stasis ulcers: You may reach 

them at [phone] for an appointment and continued management of your 

wounds. Please call them asap to schedule your appointment as they fill up 

quickly. If before that time you have any issues, please follow up with your 

PCP.





Patient should have Tubigrip size E on bilateral lower legs from balls of feet 

to 1 inch under the knees. If right leg cannot tolerate the E size you may use 

size F until patient goes to outpatient wound healing center and has 

compression wraps applied. These tubigrips should be applied each morning 

before patient gets out of bed, and are to be taken off each night. 





Wound care orders:


Right lower leg:Please pre-medicate patient with pain medication prior to 

dressing changes. 


Clean divya wound skin on feet/toes/leg on right side and all lower leg skin on 

left side every other day. Take down the dressing, then use foaming cleanser 

and clean the skin indicated above. Remove loose dead skin manually. Then use 

dimethicone cream on all the skin you cleaned except between the toes. 





After that: Change dressings on the right lower leg venous stasis ulcer wounds 

every 2 days and prn saturation. 


1. Clean with ns and gauze removing any loose dead skin/tissue with the gauze. 


2. Apply a layer of wound/silver gel over all wounds.


3. Cover with hydrofera blue transfer  (HBT)


4. Cover HBT with ABD's


5. Secure with Kerlix and netting





If dressing is saturated you may leave the hydrofera blue transfer on and 

change it Q2D, but change out the ABD and kerlix prn. 


Ary Curtis CWON








- Follow Up Care


Current Providers and Referrals: 


Patient,NotPresent [Unknown] - As per Instructions

## 2023-01-01 NOTE — CPEKG
Eat a balanced diet with plenty of fruits and vegetables. Stay physically active. Continue your current medications. Test Reason : OPEN

Blood Pressure : ***/*** mmHG

Vent. Rate : 067 BPM     Atrial Rate : 068 BPM

   P-R Int : 180 ms          QRS Dur : 163 ms

    QT Int : 449 ms       P-R-T Axes : 053 -40 092 degrees

   QTc Int : 474 ms

 

Sinus rhythm

Left bundle branch block

 

Confirmed by Jl Orellana ([id number]) on 12/7/2018 10:27:59 AM

 

Referred By:             Confirmed By:Jl Orellana [Parents] : parents